# Patient Record
Sex: FEMALE | Race: BLACK OR AFRICAN AMERICAN | NOT HISPANIC OR LATINO
[De-identification: names, ages, dates, MRNs, and addresses within clinical notes are randomized per-mention and may not be internally consistent; named-entity substitution may affect disease eponyms.]

---

## 2020-09-21 ENCOUNTER — APPOINTMENT (OUTPATIENT)
Dept: ORTHOPEDIC SURGERY | Facility: CLINIC | Age: 80
End: 2020-09-21

## 2020-10-19 PROBLEM — Z00.00 ENCOUNTER FOR PREVENTIVE HEALTH EXAMINATION: Status: ACTIVE | Noted: 2020-10-19

## 2020-10-26 ENCOUNTER — APPOINTMENT (OUTPATIENT)
Dept: ORTHOPEDIC SURGERY | Facility: CLINIC | Age: 80
End: 2020-10-26
Payer: MEDICARE

## 2020-10-26 VITALS — BODY MASS INDEX: 23.92 KG/M2 | HEIGHT: 62 IN | WEIGHT: 130 LBS

## 2020-10-26 PROCEDURE — 99204 OFFICE O/P NEW MOD 45 MIN: CPT | Mod: 25

## 2020-10-26 PROCEDURE — 20611 DRAIN/INJ JOINT/BURSA W/US: CPT | Mod: 50

## 2020-10-26 PROCEDURE — 73562 X-RAY EXAM OF KNEE 3: CPT | Mod: 50

## 2020-10-26 RX ORDER — DICLOFENAC SODIUM 20 MG/G
2 SOLUTION TOPICAL
Qty: 1 | Refills: 3 | Status: ACTIVE | COMMUNITY
Start: 2020-10-26 | End: 1900-01-01

## 2020-10-26 RX ORDER — DICLOFENAC SODIUM 20 MG/G
2 SOLUTION TOPICAL
Qty: 1 | Refills: 0 | Status: ACTIVE | COMMUNITY
Start: 2020-10-26 | End: 1900-01-01

## 2020-10-26 NOTE — PROCEDURE
[de-identified] : Patient was given a cortisone injection (Lidocaine 1% 4 cc + 10 mg of Kenalog) in the lateral compartment of the knee. Injection is performed under sterile conditions with ultrasound guidance. Patient tolerated procedure well. \par \par

## 2020-10-26 NOTE — DISCUSSION/SUMMARY
[de-identified] : Surgical risks reviewed. The reasonable risks and benefits of knee replacement surgery discussed in detail with the patient and her friend Both asked appropriate questions which were answered to their satisfaction. We talked about potential complications including complications it may require revision surgery such as component loosening failure migration wear and also potential complications of infection and stiffness requiring revision surgery as well.\par \par pt will return when ready to take further steps into surgery.

## 2020-10-26 NOTE — HISTORY OF PRESENT ILLNESS
[de-identified] : pt presents today as new patient BL knee pain. Pt is from [place] and is looking for a new orthopedist to  on her care. Pt has suffered from knee pain for years and has received gel injections in the past. Pt states the right knee is worse then the left. She went though a series of 4 gel injections however had to stop because she had to move d.t the pandemic. Pts was due for another round of injections in June 2020 that she has not received.

## 2020-10-26 NOTE — PHYSICAL EXAM
[de-identified] : AP standing shows BL KNEE OA\par Right knee no joint space medially\par left knee also with narrowed joint space but less advanced than the right knee

## 2021-11-15 ENCOUNTER — APPOINTMENT (OUTPATIENT)
Dept: ORTHOPEDIC SURGERY | Facility: CLINIC | Age: 81
End: 2021-11-15

## 2021-11-29 ENCOUNTER — APPOINTMENT (OUTPATIENT)
Dept: ORTHOPEDIC SURGERY | Facility: CLINIC | Age: 81
End: 2021-11-29
Payer: MEDICARE

## 2021-11-29 PROCEDURE — 99214 OFFICE O/P EST MOD 30 MIN: CPT | Mod: 25

## 2021-11-29 PROCEDURE — 73562 X-RAY EXAM OF KNEE 3: CPT | Mod: 50

## 2021-11-29 PROCEDURE — 20611 DRAIN/INJ JOINT/BURSA W/US: CPT | Mod: 50

## 2021-12-02 NOTE — DISCUSSION/SUMMARY
[Surgical risks reviewed] : Surgical risks reviewed [de-identified] : Surgical risks reviewed. The reasonable risks and benefits of knee replacement surgery discussed in detail with the patient. Patient asked appropriate questions which were answered to their satisfaction. We talked about potential complications including complications they may require revision surgery such as component loosening failure migration wear and also potential complications of infection and stiffness.\par \par

## 2021-12-02 NOTE — PROCEDURE
[de-identified] : Patient was given a cortisone injection (Lidocaine 1% 4 cc + 10 mg of Kenalog) in the lateral compartment of the right and left knee. Injection is performed under sterile conditions with ultrasound guidance. Patient tolerated procedure well. If patient has a history of insulin- dependant diabetes they were informed of possible increase of blood glucose levels and instructed to adjust insulin accordingly.\par \par

## 2021-12-02 NOTE — HISTORY OF PRESENT ILLNESS
[de-identified] : She returns today with complaint of bilateral knee pain right greater than left. She has failed conservative measures in the past she should discuss possible knee replacement surgery as well as having bilateral cortisone injections to her

## 2021-12-02 NOTE — PHYSICAL EXAM
[de-identified] : Right knee has definite warmth tenderness at the medial joint line range of motion is 3-105°. Quad tone is good there is no effusion knee is stable to AP stress varus valgus stress.\par \par Left knee has similar findings definite warmth and tenderness at the medial joint line. Range of motion is 0-110°. The knee was showing no effusion good quad tone is noted. [de-identified] : AP standing shows BL KNEE OA\par Right knee no joint space medially\par left knee also with narrowed joint space but less advanced than the right knee. \par

## 2022-03-21 ENCOUNTER — LABORATORY RESULT (OUTPATIENT)
Age: 82
End: 2022-03-21

## 2022-03-22 ENCOUNTER — TRANSCRIPTION ENCOUNTER (OUTPATIENT)
Age: 82
End: 2022-03-22

## 2022-03-22 VITALS
HEIGHT: 64 IN | RESPIRATION RATE: 16 BRPM | OXYGEN SATURATION: 97 % | HEART RATE: 89 BPM | SYSTOLIC BLOOD PRESSURE: 148 MMHG | TEMPERATURE: 98 F | WEIGHT: 131.84 LBS | DIASTOLIC BLOOD PRESSURE: 68 MMHG

## 2022-03-22 RX ORDER — POVIDONE-IODINE 5 %
1 AEROSOL (ML) TOPICAL ONCE
Refills: 0 | Status: COMPLETED | OUTPATIENT
Start: 2022-03-23 | End: 2022-03-23

## 2022-03-22 RX ORDER — AMLODIPINE BESYLATE 2.5 MG/1
1 TABLET ORAL
Qty: 0 | Refills: 0 | DISCHARGE

## 2022-03-22 NOTE — H&P ADULT - NSHPPHYSICALEXAM_GEN_ALL_CORE
Gen: 80 y/o, NAD  MSK: Decreased right knee ROM secondary to pain Gen: 82 y/o, NAD  MSK: Decreased right knee ROM secondary to pain  Bilat LE skin without erythema, ecchymosis, abrasions or lesions, signs of infection  Calves soft, nontender bilaterally   Sensation intact to light touch bilateral lower extremities  Pulses: DP2+ bilat LE; brisk capillary refill  Hip flex intact bilat LE; EHL/FHL/TA/GS 5/5 bilaterally     Rest of PE per MD clearance

## 2022-03-22 NOTE — PATIENT PROFILE ADULT - FALL HARM RISK - UNIVERSAL INTERVENTIONS
Bed in lowest position, wheels locked, appropriate side rails in place/Call bell, personal items and telephone in reach/Instruct patient to call for assistance before getting out of bed or chair/Non-slip footwear when patient is out of bed/[place] to call system/Physically safe environment - no spills, clutter or unnecessary equipment/Purposeful Proactive Rounding/Room/bathroom lighting operational, light cord in reach

## 2022-03-22 NOTE — H&P ADULT - NSICDXPASTSURGICALHX_GEN_ALL_CORE_FT
PAST SURGICAL HISTORY:  Elective surgery Salivary gland surgery for blocked duct    S/P hysterectomy BSO

## 2022-03-22 NOTE — H&P ADULT - NSHPSOCIALHISTORY_GEN_ALL_CORE
Patient lives in [place]. Lives alone. Several stairs in her home. Cousin lives in [facility].

## 2022-03-22 NOTE — H&P ADULT - PROBLEM SELECTOR PLAN 1
Admit to Orthopedic Service for elective R TKR  Medically cleared and optimized for surgery by Dr. Ybarra

## 2022-03-22 NOTE — H&P ADULT - NSHPLABSRESULTS_GEN_ALL_CORE
Preop CBC, BMP, PT/INR, PTT within normal range  Cr- .90  COVID PCR: neg, 3/21  UA: + leuks   3M: DOS  Preop CXR within normal limits, reviewed per medical clearance   Preop EKG WNL and reviewed per medical clearance; NSR @ 74 bpm

## 2022-03-22 NOTE — H&P ADULT - NSICDXPASTMEDICALHX_GEN_ALL_CORE_FT
PAST MEDICAL HISTORY:  HTN (hypertension)     Mild asthma      PAST MEDICAL HISTORY:  HTN (hypertension)     Mild asthma     OA (osteoarthritis)     Uterine fibroid

## 2022-03-22 NOTE — H&P ADULT - HISTORY OF PRESENT ILLNESS
81 F with right knee pain x    Presents for elective R TKR 81 F with right knee pain x several years. She has tried PT, CSI, NSAIDs. She does not currently use a cane or walker. She denies numbness or tingling. Patient lives in [place] however, her cousin had surgery with Dr. Donaldson and recommended patient to have surgery with him.     Presents for elective R TKR

## 2022-03-23 ENCOUNTER — APPOINTMENT (OUTPATIENT)
Dept: ORTHOPEDIC SURGERY | Facility: HOSPITAL | Age: 82
End: 2022-03-23
Payer: MEDICARE

## 2022-03-23 ENCOUNTER — INPATIENT (INPATIENT)
Facility: HOSPITAL | Age: 82
LOS: 3 days | Discharge: HOME | DRG: 470 | End: 2022-03-27
Attending: SPECIALIST | Admitting: SPECIALIST
Payer: MEDICARE

## 2022-03-23 DIAGNOSIS — Z41.9 ENCOUNTER FOR PROCEDURE FOR PURPOSES OTHER THAN REMEDYING HEALTH STATE, UNSPECIFIED: Chronic | ICD-10-CM

## 2022-03-23 DIAGNOSIS — Z90.710 ACQUIRED ABSENCE OF BOTH CERVIX AND UTERUS: Chronic | ICD-10-CM

## 2022-03-23 DIAGNOSIS — I10 ESSENTIAL (PRIMARY) HYPERTENSION: ICD-10-CM

## 2022-03-23 DIAGNOSIS — M17.11 UNILATERAL PRIMARY OSTEOARTHRITIS, RIGHT KNEE: ICD-10-CM

## 2022-03-23 DIAGNOSIS — J45.909 UNSPECIFIED ASTHMA, UNCOMPLICATED: ICD-10-CM

## 2022-03-23 PROCEDURE — 27447 TOTAL KNEE ARTHROPLASTY: CPT | Mod: RT

## 2022-03-23 PROCEDURE — 73560 X-RAY EXAM OF KNEE 1 OR 2: CPT | Mod: 26,RT

## 2022-03-23 PROCEDURE — 27447 TOTAL KNEE ARTHROPLASTY: CPT | Mod: AS,RT

## 2022-03-23 DEVICE — INSERT ART JRNY II BCS XLPE SZ 3-4 9MM RT: Type: IMPLANTABLE DEVICE | Status: FUNCTIONAL

## 2022-03-23 DEVICE — CEMENT PALACOS R: Type: IMPLANTABLE DEVICE | Status: FUNCTIONAL

## 2022-03-23 DEVICE — IMPLANTABLE DEVICE: Type: IMPLANTABLE DEVICE | Status: FUNCTIONAL

## 2022-03-23 DEVICE — PATELLA 35MM JOURNEY 7.5 RND RSRF: Type: IMPLANTABLE DEVICE | Status: FUNCTIONAL

## 2022-03-23 DEVICE — BASEPLATE TIB JRNY NP SZ 3 RT: Type: IMPLANTABLE DEVICE | Status: FUNCTIONAL

## 2022-03-23 DEVICE — FEM COMP JRNY II BCS BICRUCIATE RT SZ 4: Type: IMPLANTABLE DEVICE | Status: FUNCTIONAL

## 2022-03-23 RX ORDER — HYDROMORPHONE HYDROCHLORIDE 2 MG/ML
0.5 INJECTION INTRAMUSCULAR; INTRAVENOUS; SUBCUTANEOUS
Refills: 0 | Status: DISCONTINUED | OUTPATIENT
Start: 2022-03-23 | End: 2022-03-27

## 2022-03-23 RX ORDER — KETOROLAC TROMETHAMINE 30 MG/ML
15 SYRINGE (ML) INJECTION EVERY 6 HOURS
Refills: 0 | Status: DISCONTINUED | OUTPATIENT
Start: 2022-03-23 | End: 2022-03-24

## 2022-03-23 RX ORDER — ASPIRIN/CALCIUM CARB/MAGNESIUM 324 MG
325 TABLET ORAL DAILY
Refills: 0 | Status: DISCONTINUED | OUTPATIENT
Start: 2022-03-23 | End: 2022-03-27

## 2022-03-23 RX ORDER — OXYCODONE HYDROCHLORIDE 5 MG/1
5 TABLET ORAL EVERY 4 HOURS
Refills: 0 | Status: DISCONTINUED | OUTPATIENT
Start: 2022-03-23 | End: 2022-03-27

## 2022-03-23 RX ORDER — AMLODIPINE BESYLATE 2.5 MG/1
7.5 TABLET ORAL DAILY
Refills: 0 | Status: DISCONTINUED | OUTPATIENT
Start: 2022-03-23 | End: 2022-03-27

## 2022-03-23 RX ORDER — POLYETHYLENE GLYCOL 3350 17 G/17G
17 POWDER, FOR SOLUTION ORAL AT BEDTIME
Refills: 0 | Status: DISCONTINUED | OUTPATIENT
Start: 2022-03-23 | End: 2022-03-27

## 2022-03-23 RX ORDER — CHOLECALCIFEROL (VITAMIN D3) 125 MCG
1000 CAPSULE ORAL DAILY
Refills: 0 | Status: DISCONTINUED | OUTPATIENT
Start: 2022-03-23 | End: 2022-03-27

## 2022-03-23 RX ORDER — SODIUM CHLORIDE 9 MG/ML
1000 INJECTION, SOLUTION INTRAVENOUS
Refills: 0 | Status: DISCONTINUED | OUTPATIENT
Start: 2022-03-23 | End: 2022-03-27

## 2022-03-23 RX ORDER — OXYCODONE HYDROCHLORIDE 5 MG/1
10 TABLET ORAL EVERY 4 HOURS
Refills: 0 | Status: DISCONTINUED | OUTPATIENT
Start: 2022-03-23 | End: 2022-03-27

## 2022-03-23 RX ORDER — MAGNESIUM HYDROXIDE 400 MG/1
30 TABLET, CHEWABLE ORAL DAILY
Refills: 0 | Status: DISCONTINUED | OUTPATIENT
Start: 2022-03-23 | End: 2022-03-27

## 2022-03-23 RX ORDER — CHLORHEXIDINE GLUCONATE 213 G/1000ML
1 SOLUTION TOPICAL ONCE
Refills: 0 | Status: COMPLETED | OUTPATIENT
Start: 2022-03-23 | End: 2022-03-23

## 2022-03-23 RX ORDER — CEFAZOLIN SODIUM 1 G
2000 VIAL (EA) INJECTION EVERY 8 HOURS
Refills: 0 | Status: COMPLETED | OUTPATIENT
Start: 2022-03-23 | End: 2022-03-24

## 2022-03-23 RX ORDER — CELECOXIB 200 MG/1
200 CAPSULE ORAL ONCE
Refills: 0 | Status: COMPLETED | OUTPATIENT
Start: 2022-03-23 | End: 2022-03-23

## 2022-03-23 RX ORDER — ONDANSETRON 8 MG/1
4 TABLET, FILM COATED ORAL EVERY 6 HOURS
Refills: 0 | Status: DISCONTINUED | OUTPATIENT
Start: 2022-03-23 | End: 2022-03-27

## 2022-03-23 RX ORDER — PANTOPRAZOLE SODIUM 20 MG/1
40 TABLET, DELAYED RELEASE ORAL
Refills: 0 | Status: DISCONTINUED | OUTPATIENT
Start: 2022-03-23 | End: 2022-03-27

## 2022-03-23 RX ORDER — OXYCODONE HYDROCHLORIDE 5 MG/1
10 TABLET ORAL ONCE
Refills: 0 | Status: DISCONTINUED | OUTPATIENT
Start: 2022-03-23 | End: 2022-03-23

## 2022-03-23 RX ORDER — ACETAMINOPHEN 500 MG
975 TABLET ORAL EVERY 8 HOURS
Refills: 0 | Status: DISCONTINUED | OUTPATIENT
Start: 2022-03-23 | End: 2022-03-27

## 2022-03-23 RX ORDER — MORPHINE SULFATE 50 MG/1
2 CAPSULE, EXTENDED RELEASE ORAL EVERY 4 HOURS
Refills: 0 | Status: DISCONTINUED | OUTPATIENT
Start: 2022-03-23 | End: 2022-03-27

## 2022-03-23 RX ORDER — SENNA PLUS 8.6 MG/1
2 TABLET ORAL AT BEDTIME
Refills: 0 | Status: DISCONTINUED | OUTPATIENT
Start: 2022-03-23 | End: 2022-03-27

## 2022-03-23 RX ADMIN — Medication 1000 UNIT(S): at 19:00

## 2022-03-23 RX ADMIN — POLYETHYLENE GLYCOL 3350 17 GRAM(S): 17 POWDER, FOR SOLUTION ORAL at 21:08

## 2022-03-23 RX ADMIN — HYDROMORPHONE HYDROCHLORIDE 0.5 MILLIGRAM(S): 2 INJECTION INTRAMUSCULAR; INTRAVENOUS; SUBCUTANEOUS at 15:33

## 2022-03-23 RX ADMIN — CELECOXIB 200 MILLIGRAM(S): 200 CAPSULE ORAL at 09:17

## 2022-03-23 RX ADMIN — SENNA PLUS 2 TABLET(S): 8.6 TABLET ORAL at 21:08

## 2022-03-23 RX ADMIN — Medication 15 MILLIGRAM(S): at 19:16

## 2022-03-23 RX ADMIN — Medication 975 MILLIGRAM(S): at 21:08

## 2022-03-23 RX ADMIN — HYDROMORPHONE HYDROCHLORIDE 0.5 MILLIGRAM(S): 2 INJECTION INTRAMUSCULAR; INTRAVENOUS; SUBCUTANEOUS at 14:44

## 2022-03-23 RX ADMIN — Medication 100 MILLIGRAM(S): at 19:00

## 2022-03-23 RX ADMIN — Medication 1 APPLICATION(S): at 08:44

## 2022-03-23 RX ADMIN — CHLORHEXIDINE GLUCONATE 1 APPLICATION(S): 213 SOLUTION TOPICAL at 08:44

## 2022-03-23 RX ADMIN — OXYCODONE HYDROCHLORIDE 10 MILLIGRAM(S): 5 TABLET ORAL at 09:17

## 2022-03-23 RX ADMIN — Medication 975 MILLIGRAM(S): at 22:08

## 2022-03-23 RX ADMIN — Medication 15 MILLIGRAM(S): at 19:01

## 2022-03-23 NOTE — BRIEF OPERATIVE NOTE - OPERATION/FINDINGS
See operative note/dictation  R TKA w [employer] PS TKA system  4R PS Femur  3R Tibia  9mm Poly  35mm Patella

## 2022-03-23 NOTE — PHYSICAL THERAPY INITIAL EVALUATION ADULT - PERTINENT HX OF CURRENT PROBLEM, REHAB EVAL
81 F with right knee pain x several years. She has tried PT, CSI, NSAIDs. She does not currently use a cane or walker. She denies numbness or tingling. Patient lives in [place] however, her cousin had surgery with Dr. Donaldson and recommended patient to have surgery with him.

## 2022-03-23 NOTE — PACU DISCHARGE NOTE - COMMENTS
Pt AxO x4. Pain relieved with Dilaudid .5 mg iv. KHAN x4. Rt knee with aquacel dsg on. V/S stable. Report given, transported to floor with RN and pCA.

## 2022-03-23 NOTE — PHYSICAL THERAPY INITIAL EVALUATION ADULT - ADDITIONAL COMMENTS
Pt will be staying with her cousin with no steps to enter. Pt denies use of AD/DME prior to sx. Pt is more [place], in her home, she had 13 RICK.

## 2022-03-23 NOTE — PROGRESS NOTE ADULT - SUBJECTIVE AND OBJECTIVE BOX
Orthopaedics Post Op Check    Procedure: right total knee replacement   Surgeon: Dr. Donaldson     Pt comfortable, without complaints  Denies CP, SOB, N/V, numbness/tingling     Vital Signs Last 24 Hrs  T(C): 36.4 (23 Mar 2022 16:04), Max: 36.9 (23 Mar 2022 13:37)  T(F): 97.5 (23 Mar 2022 16:04), Max: 98.4 (23 Mar 2022 13:37)  HR: 92 (23 Mar 2022 16:04) (82 - 92)  BP: 125/63 (23 Mar 2022 16:04) (106/56 - 148/67)  BP(mean): 91 (23 Mar 2022 15:00) (80 - 96)  RR: 16 (23 Mar 2022 16:04) (10 - 18)  SpO2: 94% (23 Mar 2022 16:04) (94% - 100%)  AVSS, NAD    Dressing C/D/I  General: Pt Alert and oriented     Pulses: DP pulses palpable bilaterally   Sensation: SLT in tact to distal bilateral lower extremities   Motor: EHL/FHL/TA/GS 5/5 bilateral lower extremities           Post op XR: right knee prosthesis in place     A/P: 81yFemale POD#0 s/p right TKR   - Stable  - Pain Control  - DVT ppx: ASA, SCDs  - Post op abx: Ancef  - PT, WBS:  WBAT  - F/U AM Labs

## 2022-03-24 ENCOUNTER — TRANSCRIPTION ENCOUNTER (OUTPATIENT)
Age: 82
End: 2022-03-24

## 2022-03-24 LAB
ANION GAP SERPL CALC-SCNC: 12 MMOL/L — SIGNIFICANT CHANGE UP (ref 5–17)
BUN SERPL-MCNC: 13 MG/DL — SIGNIFICANT CHANGE UP (ref 7–23)
CALCIUM SERPL-MCNC: 9.4 MG/DL — SIGNIFICANT CHANGE UP (ref 8.4–10.5)
CHLORIDE SERPL-SCNC: 103 MMOL/L — SIGNIFICANT CHANGE UP (ref 96–108)
CO2 SERPL-SCNC: 24 MMOL/L — SIGNIFICANT CHANGE UP (ref 22–31)
CREAT SERPL-MCNC: 0.66 MG/DL — SIGNIFICANT CHANGE UP (ref 0.5–1.3)
EGFR: 89 ML/MIN/1.73M2 — SIGNIFICANT CHANGE UP
GLUCOSE SERPL-MCNC: 123 MG/DL — HIGH (ref 70–99)
HCT VFR BLD CALC: 34.1 % — LOW (ref 34.5–45)
HGB BLD-MCNC: 11.4 G/DL — LOW (ref 11.5–15.5)
MCHC RBC-ENTMCNC: 31.8 PG — SIGNIFICANT CHANGE UP (ref 27–34)
MCHC RBC-ENTMCNC: 33.4 GM/DL — SIGNIFICANT CHANGE UP (ref 32–36)
MCV RBC AUTO: 95.3 FL — SIGNIFICANT CHANGE UP (ref 80–100)
NRBC # BLD: 0 /100 WBCS — SIGNIFICANT CHANGE UP (ref 0–0)
PLATELET # BLD AUTO: 263 K/UL — SIGNIFICANT CHANGE UP (ref 150–400)
POTASSIUM SERPL-MCNC: 4.6 MMOL/L — SIGNIFICANT CHANGE UP (ref 3.5–5.3)
POTASSIUM SERPL-SCNC: 4.6 MMOL/L — SIGNIFICANT CHANGE UP (ref 3.5–5.3)
RBC # BLD: 3.58 M/UL — LOW (ref 3.8–5.2)
RBC # FLD: 12.2 % — SIGNIFICANT CHANGE UP (ref 10.3–14.5)
SODIUM SERPL-SCNC: 139 MMOL/L — SIGNIFICANT CHANGE UP (ref 135–145)
WBC # BLD: 13.16 K/UL — HIGH (ref 3.8–10.5)
WBC # FLD AUTO: 13.16 K/UL — HIGH (ref 3.8–10.5)

## 2022-03-24 RX ADMIN — Medication 15 MILLIGRAM(S): at 13:53

## 2022-03-24 RX ADMIN — Medication 15 MILLIGRAM(S): at 06:15

## 2022-03-24 RX ADMIN — Medication 975 MILLIGRAM(S): at 13:32

## 2022-03-24 RX ADMIN — Medication 15 MILLIGRAM(S): at 00:22

## 2022-03-24 RX ADMIN — AMLODIPINE BESYLATE 7.5 MILLIGRAM(S): 2.5 TABLET ORAL at 06:00

## 2022-03-24 RX ADMIN — Medication 15 MILLIGRAM(S): at 00:08

## 2022-03-24 RX ADMIN — Medication 975 MILLIGRAM(S): at 14:53

## 2022-03-24 RX ADMIN — Medication 1000 UNIT(S): at 13:32

## 2022-03-24 RX ADMIN — Medication 15 MILLIGRAM(S): at 12:25

## 2022-03-24 RX ADMIN — SENNA PLUS 2 TABLET(S): 8.6 TABLET ORAL at 21:46

## 2022-03-24 RX ADMIN — POLYETHYLENE GLYCOL 3350 17 GRAM(S): 17 POWDER, FOR SOLUTION ORAL at 21:46

## 2022-03-24 RX ADMIN — Medication 975 MILLIGRAM(S): at 21:46

## 2022-03-24 RX ADMIN — Medication 100 MILLIGRAM(S): at 02:43

## 2022-03-24 RX ADMIN — Medication 15 MILLIGRAM(S): at 06:00

## 2022-03-24 RX ADMIN — OXYCODONE HYDROCHLORIDE 5 MILLIGRAM(S): 5 TABLET ORAL at 16:15

## 2022-03-24 RX ADMIN — Medication 975 MILLIGRAM(S): at 07:00

## 2022-03-24 RX ADMIN — Medication 975 MILLIGRAM(S): at 06:00

## 2022-03-24 RX ADMIN — PANTOPRAZOLE SODIUM 40 MILLIGRAM(S): 20 TABLET, DELAYED RELEASE ORAL at 06:00

## 2022-03-24 RX ADMIN — Medication 975 MILLIGRAM(S): at 22:46

## 2022-03-24 NOTE — DISCHARGE NOTE PROVIDER - NSDCFUADDINST_GEN_ALL_CORE_FT
Weight bear as tolerated with assistive device.  No strenuous activity, heavy lifting, driving or returning to work until cleared by MD.  You may shower - dressing is water-resistant, no soaking in bathtubs.  Remove dressing after post op day 5-7, then leave incision open to air. Keep incision clean and dry.  Try to have regular bowel movements, take stool softener or laxative if necessary.  May take Pepcid or Prilosec for upset stomach.  May take Aleve or Naproxen if needed.  Do not apply any creams or ointments on the incision or around the incision/dressing.  Swelling may travel all the way down leg to foot, this is normal and will subside in a few weeks.  Call to schedule an appt with  _________ for follow up, if you have staples or sutures they will be removed in office.  Contact your doctor if you experience: fever greater than 101.5, chills, chest pain, difficulty breathing, redness or excessive drainage around the incision, other concerns.  Follow up with your primary care provider.   Weight bear as tolerated with assistive device.  No strenuous activity, heavy lifting, driving or returning to work until cleared by MD.  You may shower - dressing is water-resistant, no soaking in bathtubs.  Remove dressing after post op day 5-7, then leave incision open to air. Keep incision clean and dry.  Try to have regular bowel movements, take stool softener or laxative if necessary.  May take Pepcid or Prilosec for upset stomach.  May take Aleve or Naproxen if needed.  Do not apply any creams or ointments on the incision or around the incision/dressing.  Swelling may travel all the way down leg to foot, this is normal and will subside in a few weeks.  Call to schedule an appt with Dr. Donaldson for follow up, if you have staples or sutures they will be removed in office.  Contact your doctor if you experience: fever greater than 101.5, chills, chest pain, difficulty breathing, redness or excessive drainage around the incision, other concerns.  Follow up with your primary care provider.

## 2022-03-24 NOTE — PROGRESS NOTE ADULT - SUBJECTIVE AND OBJECTIVE BOX
Ortho Note    Subjective:  Pt comfortable without complaints, pain controlled with current pain medication regimen.   Denies CP, SOB, N/V, numbness/tingling       Vital Signs Last 24 Hrs  T(C): 36.9 (03-24-22 @ 08:40), Max: 36.9 (03-24-22 @ 08:40)  T(F): 98.5 (03-24-22 @ 08:40), Max: 98.5 (03-24-22 @ 08:40)  HR: 99 (03-24-22 @ 08:40) (88 - 99)  BP: 144/67 (03-24-22 @ 08:40) (130/77 - 144/67)  BP(mean): --  RR: 16 (03-24-22 @ 08:40) (16 - 16)  SpO2: 97% (03-24-22 @ 08:40) (97% - 97%)  AVSS    Objective:    Physical Exam:  General: Pt Alert and oriented, NAD  Right knee aquacel DSG C/D/I  Pulses: +2 pedal pulses, wwp toes, cap refill less than 3 seconds  Sensation: silt intact  Motor: EHL/FHL/TA/GS- firing         Plan of Care:  A/P: 81yFemale POD#1 s/p Right TKA  - afebrile, nontoxic apperance  - Pain Control- tylenol 975mg PO Q8h, toradol IV 15mg Q6h x4 doses, Oxycodone 5-10mg PO Q4h prn moderate to severe pain,     - DVT ppx: aspirin 325mg PO Daily  - PT, WBS: wbat RLE  - bowel regimen, Is use  - Dispo- home pending pt clearance    Ortho Pager [phone]

## 2022-03-24 NOTE — DISCHARGE NOTE PROVIDER - NSDCMRMEDTOKEN_GEN_ALL_CORE_FT
amLODIPine: 7.5 milligram(s) orally once a day  Co Q-10: 30 milligram(s) orally once a day  cromolyn 4% ophthalmic solution:   ibuprofen 600 mg oral tablet: 1 tab(s) orally every 6 hours, As Needed  Vitamin D3 25 mcg (1000 intl units) oral tablet: 1 tab(s) orally once a day   acetaminophen 325 mg oral tablet: 3 tab(s) orally every 8 hours  amLODIPine: 7.5 milligram(s) orally once a day  celecoxib 200 mg oral capsule: 1 cap(s) orally 2 times a day MDD:2  Co Q-10: 30 milligram(s) orally once a day  cromolyn 4% ophthalmic solution:   oxyCODONE 5 mg oral tablet: 1-2 tab(s) orally every 4 hours, As needed, Moderate Pain (4 - 6) MDD:6  polyethylene glycol 3350 oral powder for reconstitution: 17 gram(s) orally once a day (at bedtime)  senna oral tablet: 2 tab(s) orally once a day (at bedtime)  Vitamin D3 25 mcg (1000 intl units) oral tablet: 1 tab(s) orally once a day

## 2022-03-24 NOTE — DISCHARGE NOTE PROVIDER - NSDCCPCAREPLAN_GEN_ALL_CORE_FT
PRINCIPAL DISCHARGE DIAGNOSIS  Diagnosis: Osteoarthritis of right knee  Assessment and Plan of Treatment: s/p Right TKA

## 2022-03-24 NOTE — DISCHARGE NOTE PROVIDER - CARE PROVIDER_API CALL
Daniel Donaldson)  Orthopaedic Surgery  [address] [place] [address], [address] [place] [address]  Phone: [phone]  Fax: [phone]  Follow Up Time: 1 week

## 2022-03-24 NOTE — DISCHARGE NOTE PROVIDER - NSDCCPTREATMENT_GEN_ALL_CORE_FT
PRINCIPAL PROCEDURE  Procedure: Right total knee arthroplasty  Findings and Treatment: osteoarthritis of the right knee

## 2022-03-24 NOTE — DISCHARGE NOTE PROVIDER - HOSPITAL COURSE
Admitted- 3-  Surgery- s/p Right TKA  Nela-op Antibiotics  Pain control  DVT prophylaxis  OOB/Physical Therapy

## 2022-03-25 ENCOUNTER — TRANSCRIPTION ENCOUNTER (OUTPATIENT)
Age: 82
End: 2022-03-25

## 2022-03-25 LAB
ANION GAP SERPL CALC-SCNC: 10 MMOL/L — SIGNIFICANT CHANGE UP (ref 5–17)
BUN SERPL-MCNC: 12 MG/DL — SIGNIFICANT CHANGE UP (ref 7–23)
CALCIUM SERPL-MCNC: 9.6 MG/DL — SIGNIFICANT CHANGE UP (ref 8.4–10.5)
CHLORIDE SERPL-SCNC: 102 MMOL/L — SIGNIFICANT CHANGE UP (ref 96–108)
CO2 SERPL-SCNC: 28 MMOL/L — SIGNIFICANT CHANGE UP (ref 22–31)
CREAT SERPL-MCNC: 0.64 MG/DL — SIGNIFICANT CHANGE UP (ref 0.5–1.3)
EGFR: 90 ML/MIN/1.73M2 — SIGNIFICANT CHANGE UP
GLUCOSE SERPL-MCNC: 99 MG/DL — SIGNIFICANT CHANGE UP (ref 70–99)
HCT VFR BLD CALC: 32.6 % — LOW (ref 34.5–45)
HGB BLD-MCNC: 10.7 G/DL — LOW (ref 11.5–15.5)
MCHC RBC-ENTMCNC: 31 PG — SIGNIFICANT CHANGE UP (ref 27–34)
MCHC RBC-ENTMCNC: 32.8 GM/DL — SIGNIFICANT CHANGE UP (ref 32–36)
MCV RBC AUTO: 94.5 FL — SIGNIFICANT CHANGE UP (ref 80–100)
NRBC # BLD: 0 /100 WBCS — SIGNIFICANT CHANGE UP (ref 0–0)
PLATELET # BLD AUTO: 227 K/UL — SIGNIFICANT CHANGE UP (ref 150–400)
POTASSIUM SERPL-MCNC: 3.4 MMOL/L — LOW (ref 3.5–5.3)
POTASSIUM SERPL-SCNC: 3.4 MMOL/L — LOW (ref 3.5–5.3)
RBC # BLD: 3.45 M/UL — LOW (ref 3.8–5.2)
RBC # FLD: 12.5 % — SIGNIFICANT CHANGE UP (ref 10.3–14.5)
SODIUM SERPL-SCNC: 140 MMOL/L — SIGNIFICANT CHANGE UP (ref 135–145)
WBC # BLD: 9.66 K/UL — SIGNIFICANT CHANGE UP (ref 3.8–10.5)
WBC # FLD AUTO: 9.66 K/UL — SIGNIFICANT CHANGE UP (ref 3.8–10.5)

## 2022-03-25 RX ORDER — SENNA PLUS 8.6 MG/1
2 TABLET ORAL
Qty: 0 | Refills: 0 | DISCHARGE
Start: 2022-03-25

## 2022-03-25 RX ORDER — OXYCODONE HYDROCHLORIDE 5 MG/1
1 TABLET ORAL
Qty: 42 | Refills: 0
Start: 2022-03-25 | End: 2022-04-02

## 2022-03-25 RX ORDER — ACETAMINOPHEN 500 MG
3 TABLET ORAL
Qty: 0 | Refills: 0 | DISCHARGE
Start: 2022-03-25

## 2022-03-25 RX ORDER — CELECOXIB 200 MG/1
1 CAPSULE ORAL
Qty: 28 | Refills: 0
Start: 2022-03-25 | End: 2022-04-09

## 2022-03-25 RX ORDER — CELECOXIB 200 MG/1
1 CAPSULE ORAL
Qty: 28 | Refills: 0
Start: 2022-03-25 | End: 2022-04-07

## 2022-03-25 RX ORDER — POLYETHYLENE GLYCOL 3350 17 G/17G
17 POWDER, FOR SOLUTION ORAL
Qty: 0 | Refills: 0 | DISCHARGE
Start: 2022-03-25

## 2022-03-25 RX ORDER — IBUPROFEN 200 MG
1 TABLET ORAL
Qty: 0 | Refills: 0 | DISCHARGE

## 2022-03-25 RX ORDER — CELECOXIB 200 MG/1
200 CAPSULE ORAL
Refills: 0 | Status: DISCONTINUED | OUTPATIENT
Start: 2022-03-25 | End: 2022-03-27

## 2022-03-25 RX ORDER — POTASSIUM CHLORIDE 20 MEQ
20 PACKET (EA) ORAL ONCE
Refills: 0 | Status: COMPLETED | OUTPATIENT
Start: 2022-03-25 | End: 2022-03-25

## 2022-03-25 RX ORDER — OXYCODONE HYDROCHLORIDE 5 MG/1
1 TABLET ORAL
Qty: 42 | Refills: 0
Start: 2022-03-25 | End: 2022-03-31

## 2022-03-25 RX ADMIN — AMLODIPINE BESYLATE 7.5 MILLIGRAM(S): 2.5 TABLET ORAL at 05:50

## 2022-03-25 RX ADMIN — Medication 975 MILLIGRAM(S): at 13:38

## 2022-03-25 RX ADMIN — CELECOXIB 200 MILLIGRAM(S): 200 CAPSULE ORAL at 18:50

## 2022-03-25 RX ADMIN — SENNA PLUS 2 TABLET(S): 8.6 TABLET ORAL at 21:16

## 2022-03-25 RX ADMIN — Medication 325 MILLIGRAM(S): at 11:55

## 2022-03-25 RX ADMIN — Medication 1000 UNIT(S): at 11:55

## 2022-03-25 RX ADMIN — CELECOXIB 200 MILLIGRAM(S): 200 CAPSULE ORAL at 17:32

## 2022-03-25 RX ADMIN — OXYCODONE HYDROCHLORIDE 10 MILLIGRAM(S): 5 TABLET ORAL at 05:49

## 2022-03-25 RX ADMIN — Medication 975 MILLIGRAM(S): at 05:50

## 2022-03-25 RX ADMIN — Medication 975 MILLIGRAM(S): at 21:16

## 2022-03-25 RX ADMIN — OXYCODONE HYDROCHLORIDE 10 MILLIGRAM(S): 5 TABLET ORAL at 06:49

## 2022-03-25 RX ADMIN — PANTOPRAZOLE SODIUM 40 MILLIGRAM(S): 20 TABLET, DELAYED RELEASE ORAL at 05:49

## 2022-03-25 RX ADMIN — Medication 975 MILLIGRAM(S): at 06:50

## 2022-03-25 RX ADMIN — Medication 20 MILLIEQUIVALENT(S): at 17:31

## 2022-03-25 RX ADMIN — Medication 975 MILLIGRAM(S): at 22:15

## 2022-03-25 RX ADMIN — Medication 975 MILLIGRAM(S): at 14:38

## 2022-03-25 RX ADMIN — POLYETHYLENE GLYCOL 3350 17 GRAM(S): 17 POWDER, FOR SOLUTION ORAL at 21:16

## 2022-03-25 NOTE — OCCUPATIONAL THERAPY INITIAL EVALUATION ADULT - ADDITIONAL COMMENTS
Pt resides in [place] alone but plans to stay at her cousin's upon discharge. Cousin's home has no RICK and has a tub. Prior to admit, pt independent with ADLs and mobility, did not require any DME or ADs.

## 2022-03-25 NOTE — OCCUPATIONAL THERAPY INITIAL EVALUATION ADULT - MODIFIED CLINICAL TEST OF SENSORY INTEGRATION IN BALANCE TEST
Pt able to ambulate from bed>bathroom>chair ~25' with RW and CGA-SBA, no LOB noted although pt demo decreased step length with RLE.

## 2022-03-25 NOTE — DISCHARGE NOTE NURSING/CASE MANAGEMENT/SOCIAL WORK - NSDCPEFALRISK_GEN_ALL_CORE
For information on Fall & Injury Prevention, visit: [email or web address] OR  [email or web address] OR  https://www.cdc.gov/steadi/patient.html

## 2022-03-25 NOTE — OCCUPATIONAL THERAPY INITIAL EVALUATION ADULT - GENERAL OBSERVATIONS, REHAB EVAL
Pt received semi-supine in bed, +heplock, +SCDs, +R knee dressing C/D/I, in NAD and agreeable to OT. Cleared by JAMI Peterson to see.

## 2022-03-25 NOTE — DISCHARGE NOTE NURSING/CASE MANAGEMENT/SOCIAL WORK - PATIENT PORTAL LINK FT
You can access the FollowMyHealth Patient Portal offered by [facility] by registering at the following website: [email or web address]. By joining [facility]’s FollowMyHealth portal, you will also be able to view your health information using other applications (apps) compatible with our system.

## 2022-03-25 NOTE — OCCUPATIONAL THERAPY INITIAL EVALUATION ADULT - PLANNED THERAPY INTERVENTIONS, OT EVAL
ADL retraining/IADL retraining/balance training/bed mobility training/motor coordination training/parent/caregiver training.../ROM/strengthening/transfer training

## 2022-03-25 NOTE — PROGRESS NOTE ADULT - ASSESSMENT
A/P: 81yFemale s/p R TKA on 03/23  - Stable  - Pain/Nausea Control  - Home meds  - AM labs stable  - DVT ppx:  QD  - WBS: WBAT RLE  - PT: rec'd home w HPT  - Dispo pending PT clearance -- likely 03/24 vs 03/25      Ortho Pager [phone]
A/P: 81yFemale s/p R TKA on 03/23  - Stable  - Pain/Nausea Control  - Home meds  - AM labs pending  - DVT ppx:  QD  - WBS: WBAT RLE  - PT: rec'd home w HPT  - Dispo pending PT clearance -- likely 03/25 vs 03/26      Ortho Pager [phone]

## 2022-03-25 NOTE — OCCUPATIONAL THERAPY INITIAL EVALUATION ADULT - RANGE OF MOTION EXAMINATION, LOWER EXTREMITY
with exception of R knee extension and hip flexion 2/2 pain/bilateral LE Active ROM was WFL  (within functional limits)

## 2022-03-25 NOTE — PROGRESS NOTE ADULT - SUBJECTIVE AND OBJECTIVE BOX
POST OPERATIVE DAY #: 2  STATUS POST: Right  TKA                       SUBJECTIVE: Patient seen and examined. Pt. stable, but c/o pain and swelling in knee today.   Denies any sob/cp/n/v/numbness or tingling in b/l les.     OBJECTIVE:     Vital Signs Last 24 Hrs  T(C): 36.7 (25 Mar 2022 08:33), Max: 36.9 (24 Mar 2022 20:34)  T(F): 98 (25 Mar 2022 08:33), Max: 98.4 (24 Mar 2022 20:34)  HR: 90 (25 Mar 2022 09:32) (83 - 99)  BP: 122/67 (25 Mar 2022 09:32) (111/64 - 163/68)  BP(mean): --  RR: 17 (25 Mar 2022 08:33) (16 - 17)  SpO2: 98% (25 Mar 2022 08:33) (95% - 98%)    Affected extremity: right le skin intact, moderated swelling at right knee region, no erythema/ecchymosis         Dressing: clean/dry/intact          Sensation: intact to light touch to patient's baseline         Motor exam: EHL/TA/GS 5/5   Pulses 2+             I&O's Detail    24 Mar 2022 07:01  -  25 Mar 2022 07:00  --------------------------------------------------------  IN:    Oral Fluid: 320 mL  Total IN: 320 mL    OUT:    Voided (mL): 450 mL  Total OUT: 450 mL    Total NET: -130 mL      25 Mar 2022 07:01  -  25 Mar 2022 10:51  --------------------------------------------------------  IN:    Oral Fluid: 320 mL  Total IN: 320 mL    OUT:    Voided (mL): 350 mL  Total OUT: 350 mL    Total NET: -30 mL          LABS:                        10.7   9.66  )-----------( 227      ( 25 Mar 2022 06:57 )             32.6     03-25    140  |  102  |  12  ----------------------------<  99  3.4<L>   |  28  |  0.64    Ca    9.6      25 Mar 2022 06:56            MEDICATIONS:    acetaminophen     Tablet .. 975 milliGRAM(s) Oral every 8 hours  HYDROmorphone  Injectable 0.5 milliGRAM(s) IV Push every 30 minutes PRN  morphine  - Injectable 2 milliGRAM(s) IV Push every 4 hours PRN  ondansetron Injectable 4 milliGRAM(s) IV Push every 6 hours PRN  oxyCODONE    IR 5 milliGRAM(s) Oral every 4 hours PRN  oxyCODONE    IR 10 milliGRAM(s) Oral every 4 hours PRN    aspirin enteric coated 325 milliGRAM(s) Oral daily        ASSESSMENT AND PLAN: 80yo Female s/p RIGHT TKR     1. Analgesic pain control- added celebrex 200mg bid   2. DVT prophylaxis: ASA         SCDs        3. Weight Bearing Status:  Weight bearing as tolerated       4. Disposition: Home pending PT clearance  POST OPERATIVE DAY #: 2  STATUS POST: Right  TKA                       SUBJECTIVE: Patient seen and examined. Pt. stable, but c/o pain and swelling in knee today.   Denies any sob/cp/n/v/numbness or tingling in b/l les.     OBJECTIVE:     Vital Signs Last 24 Hrs  T(C): 36.7 (25 Mar 2022 08:33), Max: 36.9 (24 Mar 2022 20:34)  T(F): 98 (25 Mar 2022 08:33), Max: 98.4 (24 Mar 2022 20:34)  HR: 90 (25 Mar 2022 09:32) (83 - 99)  BP: 122/67 (25 Mar 2022 09:32) (111/64 - 163/68)  BP(mean): --  RR: 17 (25 Mar 2022 08:33) (16 - 17)  SpO2: 98% (25 Mar 2022 08:33) (95% - 98%)    Affected extremity: right le skin intact, moderated swelling at right knee region, no erythema/ecchymosis         Dressing: clean/dry/intact          Sensation: intact to light touch to patient's baseline         Motor exam: EHL/TA/GS 5/5   Pulses 2+             I&O's Detail    24 Mar 2022 07:01  -  25 Mar 2022 07:00  --------------------------------------------------------  IN:    Oral Fluid: 320 mL  Total IN: 320 mL    OUT:    Voided (mL): 450 mL  Total OUT: 450 mL    Total NET: -130 mL      25 Mar 2022 07:01  -  25 Mar 2022 10:51  --------------------------------------------------------  IN:    Oral Fluid: 320 mL  Total IN: 320 mL    OUT:    Voided (mL): 350 mL  Total OUT: 350 mL    Total NET: -30 mL          LABS:                        10.7   9.66  )-----------( 227      ( 25 Mar 2022 06:57 )             32.6     03-25    140  |  102  |  12  ----------------------------<  99  3.4<L>   |  28  |  0.64    Ca    9.6      25 Mar 2022 06:56            MEDICATIONS:    acetaminophen     Tablet .. 975 milliGRAM(s) Oral every 8 hours  HYDROmorphone  Injectable 0.5 milliGRAM(s) IV Push every 30 minutes PRN  morphine  - Injectable 2 milliGRAM(s) IV Push every 4 hours PRN  ondansetron Injectable 4 milliGRAM(s) IV Push every 6 hours PRN  oxyCODONE    IR 5 milliGRAM(s) Oral every 4 hours PRN  oxyCODONE    IR 10 milliGRAM(s) Oral every 4 hours PRN    aspirin enteric coated 325 milliGRAM(s) Oral daily        ASSESSMENT AND PLAN: 82yo Female s/p RIGHT TKR     1. Analgesic pain control- added celebrex 200mg bid   2. DVT prophylaxis: ASA         SCDs        3. Weight Bearing Status:  Weight bearing as tolerated       4. Disposition: Home pending PT clearance   5. Potassium 3.4- repleted with 20meq of K x 1 dose

## 2022-03-25 NOTE — PROGRESS NOTE ADULT - SUBJECTIVE AND OBJECTIVE BOX
Ortho Note    Pt comfortable without complaints, pain controlled  Denies CP, SOB, N/V, numbness/tingling     Vital Signs Last 24 Hrs  T(C): 36.7 (25 Mar 2022 04:57), Max: 36.9 (24 Mar 2022 08:40)  T(F): 98 (25 Mar 2022 04:57), Max: 98.5 (24 Mar 2022 08:40)  HR: 85 (25 Mar 2022 04:57) (83 - 99)  BP: 139/64 (25 Mar 2022 04:57) (139/64 - 163/68)  BP(mean): --  RR: 16 (25 Mar 2022 04:57) (16 - 17)  SpO2: 96% (25 Mar 2022 04:57) (95% - 98%)      VSS  General: A&Ox3, NAD  RLE: Aquacell DSG C/D/I  Pulses: Foot WWP; DP pulse 2+; Cap refill < 2 sec  Sensation: SILT distally and symmetric to contralateral extremity  Motor: TA/EHL/FHL/GS 5/5 and symmetric to contralateral extremity    Labs:                        11.4   13.16 )-----------( 263      ( 24 Mar 2022 06:15 )             34.1       03-24    139  |  103  |  13  ----------------------------<  123<H>  4.6   |  24  |  0.66    Ca    9.4      24 Mar 2022 06:14

## 2022-03-25 NOTE — OCCUPATIONAL THERAPY INITIAL EVALUATION ADULT - DIAGNOSIS, OT EVAL
Pt is s/p R TKR (3/23) presents with impaired balance, decreased RLE ROM and strength, RLE pain, and decreased activity tolerance impacting overall ease of completing ADLs and mobility tasks at prior level of function

## 2022-03-26 LAB
ANION GAP SERPL CALC-SCNC: 10 MMOL/L — SIGNIFICANT CHANGE UP (ref 5–17)
BUN SERPL-MCNC: 13 MG/DL — SIGNIFICANT CHANGE UP (ref 7–23)
CALCIUM SERPL-MCNC: 9.6 MG/DL — SIGNIFICANT CHANGE UP (ref 8.4–10.5)
CHLORIDE SERPL-SCNC: 103 MMOL/L — SIGNIFICANT CHANGE UP (ref 96–108)
CO2 SERPL-SCNC: 26 MMOL/L — SIGNIFICANT CHANGE UP (ref 22–31)
CREAT SERPL-MCNC: 0.64 MG/DL — SIGNIFICANT CHANGE UP (ref 0.5–1.3)
EGFR: 90 ML/MIN/1.73M2 — SIGNIFICANT CHANGE UP
GLUCOSE SERPL-MCNC: 97 MG/DL — SIGNIFICANT CHANGE UP (ref 70–99)
HCT VFR BLD CALC: 32.6 % — LOW (ref 34.5–45)
HGB BLD-MCNC: 10.4 G/DL — LOW (ref 11.5–15.5)
MCHC RBC-ENTMCNC: 30.6 PG — SIGNIFICANT CHANGE UP (ref 27–34)
MCHC RBC-ENTMCNC: 31.9 GM/DL — LOW (ref 32–36)
MCV RBC AUTO: 95.9 FL — SIGNIFICANT CHANGE UP (ref 80–100)
NRBC # BLD: 0 /100 WBCS — SIGNIFICANT CHANGE UP (ref 0–0)
PLATELET # BLD AUTO: 246 K/UL — SIGNIFICANT CHANGE UP (ref 150–400)
POTASSIUM SERPL-MCNC: 3.8 MMOL/L — SIGNIFICANT CHANGE UP (ref 3.5–5.3)
POTASSIUM SERPL-SCNC: 3.8 MMOL/L — SIGNIFICANT CHANGE UP (ref 3.5–5.3)
RBC # BLD: 3.4 M/UL — LOW (ref 3.8–5.2)
RBC # FLD: 12.7 % — SIGNIFICANT CHANGE UP (ref 10.3–14.5)
SODIUM SERPL-SCNC: 139 MMOL/L — SIGNIFICANT CHANGE UP (ref 135–145)
WBC # BLD: 9.86 K/UL — SIGNIFICANT CHANGE UP (ref 3.8–10.5)
WBC # FLD AUTO: 9.86 K/UL — SIGNIFICANT CHANGE UP (ref 3.8–10.5)

## 2022-03-26 RX ADMIN — Medication 325 MILLIGRAM(S): at 11:25

## 2022-03-26 RX ADMIN — CELECOXIB 200 MILLIGRAM(S): 200 CAPSULE ORAL at 18:50

## 2022-03-26 RX ADMIN — CELECOXIB 200 MILLIGRAM(S): 200 CAPSULE ORAL at 17:50

## 2022-03-26 RX ADMIN — AMLODIPINE BESYLATE 7.5 MILLIGRAM(S): 2.5 TABLET ORAL at 05:22

## 2022-03-26 RX ADMIN — Medication 975 MILLIGRAM(S): at 14:16

## 2022-03-26 RX ADMIN — Medication 975 MILLIGRAM(S): at 13:46

## 2022-03-26 RX ADMIN — Medication 975 MILLIGRAM(S): at 21:15

## 2022-03-26 RX ADMIN — PANTOPRAZOLE SODIUM 40 MILLIGRAM(S): 20 TABLET, DELAYED RELEASE ORAL at 05:19

## 2022-03-26 RX ADMIN — CELECOXIB 200 MILLIGRAM(S): 200 CAPSULE ORAL at 05:19

## 2022-03-26 RX ADMIN — CELECOXIB 200 MILLIGRAM(S): 200 CAPSULE ORAL at 06:16

## 2022-03-26 RX ADMIN — Medication 975 MILLIGRAM(S): at 22:00

## 2022-03-26 RX ADMIN — Medication 975 MILLIGRAM(S): at 05:19

## 2022-03-26 RX ADMIN — Medication 975 MILLIGRAM(S): at 06:16

## 2022-03-26 RX ADMIN — Medication 1000 UNIT(S): at 11:25

## 2022-03-26 NOTE — PROGRESS NOTE ADULT - SUBJECTIVE AND OBJECTIVE BOX
Ortho Note    Pt comfortable without complaints, pain controlled  Denies CP, SOB, N/V, numbness/tingling     Vital Signs Last 24 Hrs  T(C): 36.4 (26 Mar 2022 04:55), Max: 36.9 (25 Mar 2022 16:22)  T(F): 97.6 (26 Mar 2022 04:55), Max: 98.5 (25 Mar 2022 16:22)  HR: 94 (26 Mar 2022 04:55) (90 - 99)  BP: 155/78 (26 Mar 2022 04:55) (105/62 - 155/78)  BP(mean): --  RR: 16 (26 Mar 2022 04:55) (16 - 18)  SpO2: 95% (26 Mar 2022 04:55) (93% - 98%)    VSS  General: A&Ox3, NAD  RLE: Aquacell DSG C/D/I  Pulses: Foot WWP; DP pulse 2+; Cap refill < 2 sec  Sensation: SILT distally and symmetric to contralateral extremity  Motor: TA/EHL/FHL/GS 5/5 and symmetric to contralateral extremity      A/P: 81yFemale s/p R TKA on 03/23  - Stable  - Pain/Nausea Control  - Home meds  - AM labs pending  - DVT ppx:  QD  - WBS: WBAT RLE  - PT: HPT pending PT clearance      Ortho Pager [phone]

## 2022-03-26 NOTE — PROGRESS NOTE ADULT - SUBJECTIVE AND OBJECTIVE BOX
Ortho Note    Pt comfortable without complaints, pain controlled  Denies CP, SOB, N/V, numbness/tingling     Vital Signs Last 24 Hrs  T(C): 36.7 (26 Mar 2022 16:17), Max: 36.7 (26 Mar 2022 09:09)  T(F): 98.1 (26 Mar 2022 16:17), Max: 98.1 (26 Mar 2022 16:17)  HR: 103 (26 Mar 2022 16:17) (94 - 103)  BP: 112/65 (26 Mar 2022 16:17) (112/65 - 155/78)  BP(mean): --  RR: 18 (26 Mar 2022 16:17) (16 - 18)  SpO2: 97% (26 Mar 2022 16:17) (94% - 97%)    VSS  General: A&Ox3, NAD  RLE: Aquacell DSG C/D/I  Pulses: Foot WWP; DP pulse 2+; Cap refill < 2 sec  Sensation: SILT distally and symmetric to contralateral extremity  Motor: TA/EHL/FHL/GS 5/5 and symmetric to contralateral extremity      A/P: 81yFemale s/p R TKA on 03/23  - Stable  - Pain/Nausea Control  - Home meds  - AM labs pending  - DVT ppx:  QD  - WBS: WBAT RLE  - PT: HPT pending PT clearance      Ortho Pager [phone]

## 2022-03-27 VITALS
RESPIRATION RATE: 17 BRPM | SYSTOLIC BLOOD PRESSURE: 116 MMHG | TEMPERATURE: 99 F | DIASTOLIC BLOOD PRESSURE: 58 MMHG | HEART RATE: 105 BPM | OXYGEN SATURATION: 98 %

## 2022-03-27 LAB
ANION GAP SERPL CALC-SCNC: 13 MMOL/L — SIGNIFICANT CHANGE UP (ref 5–17)
BUN SERPL-MCNC: 15 MG/DL — SIGNIFICANT CHANGE UP (ref 7–23)
CALCIUM SERPL-MCNC: 9.9 MG/DL — SIGNIFICANT CHANGE UP (ref 8.4–10.5)
CHLORIDE SERPL-SCNC: 102 MMOL/L — SIGNIFICANT CHANGE UP (ref 96–108)
CO2 SERPL-SCNC: 25 MMOL/L — SIGNIFICANT CHANGE UP (ref 22–31)
CREAT SERPL-MCNC: 0.75 MG/DL — SIGNIFICANT CHANGE UP (ref 0.5–1.3)
EGFR: 81 ML/MIN/1.73M2 — SIGNIFICANT CHANGE UP
GLUCOSE SERPL-MCNC: 116 MG/DL — HIGH (ref 70–99)
HCT VFR BLD CALC: 34.4 % — LOW (ref 34.5–45)
HGB BLD-MCNC: 11 G/DL — LOW (ref 11.5–15.5)
MCHC RBC-ENTMCNC: 30.5 PG — SIGNIFICANT CHANGE UP (ref 27–34)
MCHC RBC-ENTMCNC: 32 GM/DL — SIGNIFICANT CHANGE UP (ref 32–36)
MCV RBC AUTO: 95.3 FL — SIGNIFICANT CHANGE UP (ref 80–100)
NRBC # BLD: 0 /100 WBCS — SIGNIFICANT CHANGE UP (ref 0–0)
PLATELET # BLD AUTO: 327 K/UL — SIGNIFICANT CHANGE UP (ref 150–400)
POTASSIUM SERPL-MCNC: 4.3 MMOL/L — SIGNIFICANT CHANGE UP (ref 3.5–5.3)
POTASSIUM SERPL-SCNC: 4.3 MMOL/L — SIGNIFICANT CHANGE UP (ref 3.5–5.3)
RBC # BLD: 3.61 M/UL — LOW (ref 3.8–5.2)
RBC # FLD: 12.5 % — SIGNIFICANT CHANGE UP (ref 10.3–14.5)
SODIUM SERPL-SCNC: 140 MMOL/L — SIGNIFICANT CHANGE UP (ref 135–145)
WBC # BLD: 10.16 K/UL — SIGNIFICANT CHANGE UP (ref 3.8–10.5)
WBC # FLD AUTO: 10.16 K/UL — SIGNIFICANT CHANGE UP (ref 3.8–10.5)

## 2022-03-27 PROCEDURE — 73560 X-RAY EXAM OF KNEE 1 OR 2: CPT

## 2022-03-27 PROCEDURE — 80048 BASIC METABOLIC PNL TOTAL CA: CPT

## 2022-03-27 PROCEDURE — C1713: CPT

## 2022-03-27 PROCEDURE — 97161 PT EVAL LOW COMPLEX 20 MIN: CPT

## 2022-03-27 PROCEDURE — 85027 COMPLETE CBC AUTOMATED: CPT

## 2022-03-27 PROCEDURE — 36415 COLL VENOUS BLD VENIPUNCTURE: CPT

## 2022-03-27 PROCEDURE — 97110 THERAPEUTIC EXERCISES: CPT

## 2022-03-27 PROCEDURE — 97116 GAIT TRAINING THERAPY: CPT

## 2022-03-27 PROCEDURE — C1776: CPT

## 2022-03-27 PROCEDURE — 97530 THERAPEUTIC ACTIVITIES: CPT

## 2022-03-27 RX ORDER — ASPIRIN/CALCIUM CARB/MAGNESIUM 324 MG
1 TABLET ORAL
Qty: 30 | Refills: 0
Start: 2022-03-27 | End: 2022-04-25

## 2022-03-27 RX ADMIN — CELECOXIB 200 MILLIGRAM(S): 200 CAPSULE ORAL at 06:00

## 2022-03-27 RX ADMIN — Medication 975 MILLIGRAM(S): at 05:15

## 2022-03-27 RX ADMIN — Medication 975 MILLIGRAM(S): at 06:00

## 2022-03-27 RX ADMIN — CELECOXIB 200 MILLIGRAM(S): 200 CAPSULE ORAL at 05:16

## 2022-03-27 RX ADMIN — AMLODIPINE BESYLATE 7.5 MILLIGRAM(S): 2.5 TABLET ORAL at 05:16

## 2022-03-27 RX ADMIN — PANTOPRAZOLE SODIUM 40 MILLIGRAM(S): 20 TABLET, DELAYED RELEASE ORAL at 05:15

## 2022-03-27 NOTE — PROGRESS NOTE ADULT - SUBJECTIVE AND OBJECTIVE BOX
Ortho Note    Pt comfortable without complaints, pain controlled  Denies CP, SOB, N/V, numbness/tingling     Vital Signs Last 24 Hrs  T(C): 36.7 (27 Mar 2022 04:30), Max: 36.8 (26 Mar 2022 20:10)  T(F): 98.1 (27 Mar 2022 04:30), Max: 98.2 (26 Mar 2022 20:10)  HR: 95 (27 Mar 2022 04:30) (87 - 103)  BP: 147/60 (27 Mar 2022 04:30) (112/65 - 147/60)  BP(mean): --  RR: 17 (27 Mar 2022 04:30) (17 - 18)  SpO2: 95% (27 Mar 2022 04:30) (94% - 97%)    VSS  General: A&Ox3, NAD  RLE: Aquacell DSG C/D/I  Pulses: Foot WWP; DP pulse 2+; Cap refill < 2 sec  Sensation: SILT distally and symmetric to contralateral extremity  Motor: TA/EHL/FHL/GS 5/5 and symmetric to contralateral extremity    A/P: 81yFemale s/p R TKA on 03/23  - Stable  - Pain/Nausea Control  - Home meds  - AM labs pending  - DVT ppx:  QD  - WBS: WBAT RLE  - PT: HPT pending PT clearance      Ortho Pager [phone]

## 2022-03-27 NOTE — PROGRESS NOTE ADULT - REASON FOR ADMISSION
Right knee pain

## 2022-03-30 DIAGNOSIS — M17.11 UNILATERAL PRIMARY OSTEOARTHRITIS, RIGHT KNEE: ICD-10-CM

## 2022-03-30 DIAGNOSIS — I10 ESSENTIAL (PRIMARY) HYPERTENSION: ICD-10-CM

## 2022-03-30 DIAGNOSIS — J45.909 UNSPECIFIED ASTHMA, UNCOMPLICATED: ICD-10-CM

## 2022-04-07 RX ORDER — OXYCODONE AND ACETAMINOPHEN 5; 325 MG/1; MG/1
5-325 TABLET ORAL
Qty: 15 | Refills: 0 | Status: ACTIVE | COMMUNITY
Start: 2022-04-07 | End: 1900-01-01

## 2022-04-08 PROBLEM — I10 ESSENTIAL (PRIMARY) HYPERTENSION: Chronic | Status: ACTIVE | Noted: 2022-03-22

## 2022-04-08 PROBLEM — M19.90 UNSPECIFIED OSTEOARTHRITIS, UNSPECIFIED SITE: Chronic | Status: ACTIVE | Noted: 2022-03-22

## 2022-04-08 PROBLEM — J45.909 UNSPECIFIED ASTHMA, UNCOMPLICATED: Chronic | Status: ACTIVE | Noted: 2022-03-22

## 2022-04-08 PROBLEM — D25.9 LEIOMYOMA OF UTERUS, UNSPECIFIED: Chronic | Status: ACTIVE | Noted: 2022-03-22

## 2022-04-14 ENCOUNTER — APPOINTMENT (OUTPATIENT)
Dept: ORTHOPEDIC SURGERY | Facility: CLINIC | Age: 82
End: 2022-04-14
Payer: MEDICARE

## 2022-04-14 PROCEDURE — 99024 POSTOP FOLLOW-UP VISIT: CPT

## 2022-04-14 NOTE — HISTORY OF PRESENT ILLNESS
[de-identified] : Patient is here 2 and half weeks status post right knee replacement.  She is ambulating with a walker uses minimal pain tablets at this point has been doing her home PT.

## 2022-04-14 NOTE — PHYSICAL EXAM
[de-identified] : Patient's wound is nicely healed no erythema no drainage.  Range of motion is 10 to 90 degrees. [de-identified] : Radiographs of the knees were ordered today.  AP standing individual lateral and sunrise views were obtained showing a well-positioned [employer] type right knee prosthesis.

## 2022-04-14 NOTE — DISCUSSION/SUMMARY
[de-identified] : Patient will be transition to outpatient physical therapy we will also place her in a nighttime extension splint to help achieve full terminal extension follow-up in 4 5 weeks.

## 2022-04-18 RX ORDER — AMLODIPINE BESYLATE 5 MG/1
5 TABLET ORAL DAILY
Qty: 20 | Refills: 0 | Status: ACTIVE | COMMUNITY
Start: 2022-04-18 | End: 1900-01-01

## 2022-05-11 NOTE — OCCUPATIONAL THERAPY INITIAL EVALUATION ADULT - RANGE OF MOTION EXAMINATION, UPPER EXTREMITY
"Discharge Summary    CHIEF COMPLAINT ON ADMISSION  Chief Complaint   Patient presents with   • Possible Stroke       Reason for Admission  Stroke     Admission Date  5/9/2022    CODE STATUS  Full Code    HPI & HOSPITAL COURSE  For full details of admission please see the H&P of Dr. Maddox dated 5/9/2022, briefly, \"The history is obtained from healthcare providers, the medical record and the wife at the bedside as this gentleman has aphasia and has some difficulty providing a history.  This gentleman has a history of primary hypertension, dyslipidemia, CAD with prior MI and PCI to the LAD, severe FRANK with an AHI of 87.6 on CPAP at 10 cmH2O and PAD.  He was last seen well at approximately 0930 hours this morning.  His wife went to swim class and when she came home she noticed that there was sausage and singletary on the kitchen counter.  She prepared lunch and when she went to summon this gentleman for lunch she noticed that his speech was garbled.  She immediately took him to [facility].  CT imaging revealed no acute pathology, but evidence of a perfusion deficit and decreased flow in a tiny arterial branch in the left parietal region.  He received tenecteplase and was sent to [facility] for subspecialty care.  He does not smoke cigarettes.  He admits to enjoying two rather stiff highballs every evening consisting of either [place] [employer] or Festus Beam with some 7-Up.  He also admits that he likes to have a couple hits of marijuana after supper and he then he takes a gummy prior to going to bed with his CPAP.\"    Patient had a rapid improvement in his strokelike symptoms.  MRI was obtained confirming stroke.  Patient was seen by physical and Occupational Therapy who deemed him to have no significant subsequent deficits, he was noted to have a persistent left facial droop which was massively improved from admission.  Verbal status was massively improved.  Patient was stable for discharge. "  He will be continued on Plavix for stroke prevention.  He is to follow-up with his PCP in short order.  He is to follow-up with the [facility] as directed.    Therefore, he is discharged in good and stable condition to home with close outpatient follow-up.    The patient met 2-midnight criteria for an inpatient stay at the time of discharge.    Discharge Date  05/11/22      FOLLOW UP ITEMS POST DISCHARGE  outpatient PT/OT and follow up with outpatient PM&R with Dr. Barajas    DISCHARGE DIAGNOSES  Principal Problem:    Acute ischemic stroke (HCC) POA: Yes  Active Problems:    CAD (coronary artery disease) POA: Yes    Hypertension POA: Yes    PVD (peripheral vascular disease) (HCC) POA: Yes    FRANK on CPAP POA: Yes  Resolved Problems:    * No resolved hospital problems. *      FOLLOW UP  No future appointments.  Ketan Anne M.D.  [address] [facility] [address]  [facility] [address]  [phone]    Follow up in 6 week(s)  Follow up appointment      MEDICATIONS ON DISCHARGE     Medication List      START taking these medications      Instructions   clopidogrel 75 MG Tabs  Start taking on: May 12, 2022  Commonly known as: PLAVIX   Take 1 Tablet by mouth every day for 90 days.  Dose: 75 mg        CONTINUE taking these medications      Instructions   atorvastatin 40 MG Tabs  Commonly known as: LIPITOR   Take 1 Tablet by mouth 1/2 hour before dinner.  Dose: 40 mg     latanoprost 0.005 % Soln  Commonly known as: XALATAN   Administer 1 Drop into both eyes at bedtime.  Dose: 1 Drop     losartan 25 MG Tabs  Commonly known as: COZAAR   Take 1 Tablet by mouth every day.  Dose: 25 mg     metoprolol tartrate 25 MG Tabs  Commonly known as: LOPRESSOR   Take 1 Tablet by mouth 2 times a day. TAKE 1 TAB BY MOUTH 2 TIMES A DAY.  Dose: 25 mg     minocycline 100 MG Caps  Commonly known as: MINOCIN   Take 100 mg by mouth 2 times a day.  Dose: 100 mg     pimecrolimus 1 % cream  Commonly known as: ELIDEL   Apply 1 Application  topically 2 times a day.  Dose: 1 Application     traZODone 50 MG Tabs  Commonly known as: DESYREL   Take 1-2 Tablets by mouth at bedtime as needed for Sleep.  Dose:  mg            Allergies  No Known Allergies    DIET  Orders Placed This Encounter   Procedures   • Diet Order Diet: Regular     Standing Status:   Standing     Number of Occurrences:   1     Order Specific Question:   Diet:     Answer:   Regular [1]       ACTIVITY  As tolerated.  Weight bearing as tolerated    CONSULTATIONS  Mt, PM&R  Dilan, Neuro  Cornelia, [facility] Medicine    RADIOLOGY  EC-ECHOCARDIOGRAM COMPLETE W/ CONT   Final Result      MR-BRAIN-W/O   Final Result      1.  A tiny area of acute infarct in the left posterior insular cortex.   2.  Chronic infarcts in the left frontal and right posterior temporal lobes.   3.  Mild chronic microvascular ischemic disease.   4.  Mild cerebral volume mass.            PROCEDURES  None    LABORATORY  Lab Results   Component Value Date    SODIUM 145 05/10/2022    POTASSIUM 4.0 05/10/2022    CHLORIDE 108 05/10/2022    CO2 27 05/10/2022    GLUCOSE 100 (H) 05/10/2022    BUN 12 05/10/2022    CREATININE 0.92 05/10/2022        Lab Results   Component Value Date    WBC 8.6 05/11/2022    HEMOGLOBIN 14.1 05/11/2022    HEMATOCRIT 42.6 05/11/2022    PLATELETCT 209 05/11/2022        Total time of the discharge process exceeds 33 minutes.   bilateral UE Active ROM was WFL  (within functional limits)

## 2022-05-12 ENCOUNTER — APPOINTMENT (OUTPATIENT)
Dept: ORTHOPEDIC SURGERY | Facility: CLINIC | Age: 82
End: 2022-05-12
Payer: MEDICARE

## 2022-05-12 VITALS — HEIGHT: 64 IN | BODY MASS INDEX: 22.2 KG/M2 | WEIGHT: 130 LBS

## 2022-05-12 PROCEDURE — 99213 OFFICE O/P EST LOW 20 MIN: CPT | Mod: 24

## 2022-05-12 PROCEDURE — 20611 DRAIN/INJ JOINT/BURSA W/US: CPT | Mod: 1L,LT

## 2022-05-27 ENCOUNTER — LABORATORY RESULT (OUTPATIENT)
Age: 82
End: 2022-05-27

## 2022-05-31 ENCOUNTER — APPOINTMENT (OUTPATIENT)
Dept: ORTHOPEDIC SURGERY | Facility: AMBULATORY SURGERY CENTER | Age: 82
End: 2022-05-31

## 2022-05-31 PROCEDURE — 27570 FIXATION OF KNEE JOINT: CPT | Mod: 78,RT

## 2022-06-02 ENCOUNTER — APPOINTMENT (OUTPATIENT)
Dept: ORTHOPEDIC SURGERY | Facility: CLINIC | Age: 82
End: 2022-06-02
Payer: MEDICARE

## 2022-06-02 VITALS
SYSTOLIC BLOOD PRESSURE: 126 MMHG | DIASTOLIC BLOOD PRESSURE: 67 MMHG | HEART RATE: 90 BPM | OXYGEN SATURATION: 98 % | TEMPERATURE: 98.7 F | RESPIRATION RATE: 18 BRPM

## 2022-06-02 PROCEDURE — 99024 POSTOP FOLLOW-UP VISIT: CPT

## 2022-06-02 RX ORDER — ASPIRIN 325 MG/1
325 TABLET, FILM COATED ORAL
Qty: 30 | Refills: 0 | Status: ACTIVE | COMMUNITY
Start: 2022-03-27

## 2022-06-02 RX ORDER — AMLODIPINE BESYLATE 2.5 MG/1
2.5 TABLET ORAL
Qty: 90 | Refills: 0 | Status: ACTIVE | COMMUNITY
Start: 2022-05-16

## 2022-06-02 RX ORDER — HYDROCORTISONE VALERATE 2 MG/G
0.2 CREAM TOPICAL
Qty: 15 | Refills: 0 | Status: ACTIVE | COMMUNITY
Start: 2022-05-18

## 2022-06-02 RX ORDER — KETOTIFEN FUMARATE 0.25 MG/ML
0.03 SOLUTION/ DROPS OPHTHALMIC
Qty: 5 | Refills: 0 | Status: ACTIVE | COMMUNITY
Start: 2022-05-18

## 2022-06-02 RX ORDER — LEVOCETIRIZINE DIHYDROCHLORIDE 5 MG/1
5 TABLET ORAL
Qty: 30 | Refills: 0 | Status: ACTIVE | COMMUNITY
Start: 2022-05-13

## 2022-06-02 RX ORDER — OXYCODONE 5 MG/1
5 TABLET ORAL
Qty: 42 | Refills: 0 | Status: ACTIVE | COMMUNITY
Start: 2022-03-27

## 2022-06-02 RX ORDER — NITROFURANTOIN (MONOHYDRATE/MACROCRYSTALS) 25; 75 MG/1; MG/1
100 CAPSULE ORAL
Qty: 10 | Refills: 0 | Status: ACTIVE | COMMUNITY
Start: 2022-03-11

## 2022-06-02 RX ORDER — CELECOXIB 200 MG/1
200 CAPSULE ORAL
Qty: 28 | Refills: 0 | Status: ACTIVE | COMMUNITY
Start: 2022-03-27

## 2022-06-02 RX ORDER — PREDNISONE 20 MG/1
20 TABLET ORAL
Qty: 21 | Refills: 0 | Status: ACTIVE | COMMUNITY
Start: 2022-01-15

## 2022-06-02 NOTE — DISCUSSION/SUMMARY
[de-identified] : Patient will be sent to formal physical therapy to help improve her terminal flexion.  She will follow-up in 4 weeks time for evaluation.

## 2022-06-02 NOTE — HISTORY OF PRESENT ILLNESS
[de-identified] : Patient is status postop day 2 right knee manipulation under anesthesia.  He was noted during the manipulation that her terminal flexion improved from 90degrees to 120 degrees.  Patient states she feels today that she has definite improvement in her terminal flexion.

## 2022-06-02 NOTE — PHYSICAL EXAM
[de-identified] : Left knee has some mild swelling neurovascular intact range of motion is 3 to 105 degrees.

## 2022-06-22 ENCOUNTER — RX RENEWAL (OUTPATIENT)
Age: 82
End: 2022-06-22

## 2022-06-22 RX ORDER — IBUPROFEN 600 MG/1
600 TABLET, FILM COATED ORAL 3 TIMES DAILY
Qty: 90 | Refills: 0 | Status: ACTIVE | COMMUNITY
Start: 2022-05-25 | End: 1900-01-01

## 2022-06-23 ENCOUNTER — APPOINTMENT (OUTPATIENT)
Dept: ORTHOPEDIC SURGERY | Facility: CLINIC | Age: 82
End: 2022-06-23

## 2022-06-23 PROCEDURE — 99213 OFFICE O/P EST LOW 20 MIN: CPT

## 2022-06-23 NOTE — PHYSICAL EXAM
[de-identified] : The knee is swollen today there seems to be a fairly large area of scar tissue in the suprapatellar region.  There is no erythema mild warmth no drainage no redness.  Range of motion today is 20-90.

## 2022-06-23 NOTE — DISCUSSION/SUMMARY
[de-identified] : Patient seems to be worsening as opposed to improving status post manipulation.  In order to rule out an indolent infection she will be sent for lab tests such as a CBC, ESR, C-reactive protein.  Clinical suspicion is low but we need to rule this out because of the swelling and stiffness.  She will be contacted once we have the results.

## 2022-06-23 NOTE — HISTORY OF PRESENT ILLNESS
[de-identified] : Patient returns today she is not improving status post recent knee replacement right-sided with manipulation 523 approximately.  Patient states she has significant discomfort seems that the knee is stiffening as opposed to improving despite a manipulation which left her with range of motion of 3 to 105 degrees.

## 2022-06-23 NOTE — REASON FOR VISIT
[FreeTextEntry2] : 2nd visited s/p Rt knee manipulation on 5/31/2022. Still c/o of pain and very bad stiff. Sated "I am not getting better".

## 2022-07-07 ENCOUNTER — APPOINTMENT (OUTPATIENT)
Dept: ORTHOPEDIC SURGERY | Facility: CLINIC | Age: 82
End: 2022-07-07

## 2022-07-07 PROCEDURE — 99214 OFFICE O/P EST MOD 30 MIN: CPT

## 2022-07-07 NOTE — DISCUSSION/SUMMARY
[de-identified] : Patient needs to be ruled out for infection at this point.  There is no fluid we talked about the choice between an aspiration with a low potential of fluid accumulation versus bone scans patient is chosen the bone scans I thought that was the correct decision due to the fact that she upon very close examination does not seem to have any fluid that can be aspirated.\par \par Patient will therefore be sent for scans to help evaluate her for infection.  If the scans are ultimately positive patient will have to undergo staged revision procedure if they are negative we talked about potential of an open synovectomy to help improve her range of motion.

## 2022-07-07 NOTE — HISTORY OF PRESENT ILLNESS
[de-identified] : Patient returns today 5 months status post knee replacement surgery.  She is clinically stating that she is worsening in terms of pain and lack of mobility.  Patient did have a manipulation under anesthesia which at the time significantly improved her range of motion but the range of motion has not been sustained in terms of the improvement.  Patient was sent local laboratories for blood test her ESR was 31 with the upper limit being normal is 30 and the C-reactive protein was 8.7 where the upper limit of normal is 8.

## 2022-07-07 NOTE — PHYSICAL EXAM
[de-identified] : Knee exam today range of motion is 10-70 there is warmth about the knee no erythema no effusion is noted.  She is neurovascular intact distally.

## 2022-07-21 ENCOUNTER — APPOINTMENT (OUTPATIENT)
Dept: ORTHOPEDIC SURGERY | Facility: CLINIC | Age: 82
End: 2022-07-21

## 2022-07-21 PROCEDURE — 99213 OFFICE O/P EST LOW 20 MIN: CPT

## 2022-07-21 RX ORDER — PREDNISONE 10 MG/1
10 TABLET ORAL
Qty: 5 | Refills: 0 | Status: ACTIVE | COMMUNITY
Start: 2022-07-08

## 2022-07-21 RX ORDER — ALBUTEROL SULFATE 90 UG/1
108 (90 BASE) INHALANT RESPIRATORY (INHALATION)
Qty: 8 | Refills: 0 | Status: ACTIVE | COMMUNITY
Start: 2022-06-04

## 2022-07-21 RX ORDER — PREDNISONE 50 MG/1
50 TABLET ORAL
Qty: 5 | Refills: 0 | Status: ACTIVE | COMMUNITY
Start: 2022-06-04

## 2022-07-21 RX ORDER — BENZONATATE 200 MG/1
200 CAPSULE ORAL
Qty: 20 | Refills: 0 | Status: ACTIVE | COMMUNITY
Start: 2022-06-04

## 2022-07-21 RX ORDER — PIMECROLIMUS 10 MG/G
1 CREAM TOPICAL
Qty: 30 | Refills: 0 | Status: ACTIVE | COMMUNITY
Start: 2022-07-11

## 2022-07-21 NOTE — PHYSICAL EXAM
[de-identified] : Knee exam today range of motion is 10-70 there is warmth about the knee no erythema no effusion is noted.  She is neurovascular intact distally.

## 2022-07-21 NOTE — HISTORY OF PRESENT ILLNESS
[de-identified] : Patient returns today the results of her right knee bone scan indicate that she has a positive finding indeterminant however possibility of loosening postoperative changes or infection are noted.  Patient continues to have soreness and stiffness in the knee she is status post right knee replacement 5 months with a manipulation but that has not improved her knee range of motion.

## 2022-07-21 NOTE — DISCUSSION/SUMMARY
[de-identified] : Patient will be told to get a indium scan and call us she does not need to travel here for us to discuss the results.  We already talked at length about the possibilities that exist the patient may have just scar formation if we rule out infection that could be addressed possibly with a repeat manipulation or open scar excision.  We also talked at length about the process of treating an infected knee if it turns out to actively infected.  We will try to expedite her scans since we are unable to aspirate the knee despite our attempt today

## 2022-07-21 NOTE — PROCEDURE
[de-identified] : We attempted an aspiration of the knee in order to avoid further scanning.  However we were unable to have any fluid aspirated today.

## 2022-08-11 ENCOUNTER — APPOINTMENT (OUTPATIENT)
Dept: ORTHOPEDIC SURGERY | Facility: CLINIC | Age: 82
End: 2022-08-11

## 2022-09-01 ENCOUNTER — APPOINTMENT (OUTPATIENT)
Dept: ORTHOPEDIC SURGERY | Facility: CLINIC | Age: 82
End: 2022-09-01

## 2022-09-01 PROCEDURE — 99214 OFFICE O/P EST MOD 30 MIN: CPT

## 2022-09-01 NOTE — DISCUSSION/SUMMARY
[de-identified] : Had a long discussion with the patient today I believe that she can consider a repeat manipulation however I do not see any significant improvement usually with terminal extension with these manipulations.  We also had a long discussion about possible open scar excision and revision of components as needed.  Patient was told there is no guarantee that she would not get endocarditis left her surgery but we will be very thorough and removing scar tissue.  We will also be getting CT scan to help evaluate component position.  Patient will consider this option is most benefits discussed in detail of the revision surgery including nonimprovement of range of motion infection further stiffness and pain.

## 2022-09-01 NOTE — HISTORY OF PRESENT ILLNESS
[de-identified] : Patient returns today the results of her multiple scans including most recent sulfur colloid scan shows no evidence of ongoing infection.  Patient is here to discuss her options she continues to have pain and stiffness in the right knee.

## 2022-09-01 NOTE — PHYSICAL EXAM
[de-identified] : Right knee range of motion is 12-90 there is warmth about the knee.  No obvious effusion.  Soft tissue swelling is noted.  Knee stable to AP stress varus valgus stress at 90 degrees as well as 12 degrees short of full extension.

## 2022-10-10 ENCOUNTER — LABORATORY RESULT (OUTPATIENT)
Age: 82
End: 2022-10-10

## 2022-10-11 RX ORDER — POVIDONE-IODINE 5 %
1 AEROSOL (ML) TOPICAL ONCE
Refills: 0 | Status: COMPLETED | OUTPATIENT
Start: 2022-10-19 | End: 2022-10-19

## 2022-10-11 NOTE — H&P ADULT - NSHPPHYSICALEXAM_GEN_ALL_CORE
Gen: 82F, NAD  MSK: Decreased right knee ROM secondary to pain    Rest of PE per MD clearance Gen: 82F, NAD  MSK: Right knee healed surgical scar.  Skin warm and well perfused. DP pulses palpable right lower extremity . Sensation intact and equal bilateral lower extremities. EHL/TA/GS/FHL 5/5 bilateral lower extremities. Decreased right knee ROM secondary to pain    Rest of PE per MD clearance

## 2022-10-11 NOTE — H&P ADULT - NSHPLABSRESULTS_GEN_ALL_CORE
Covid: neg, 10/10 Covid: neg, 10/17  Preop CBC, BMP, PT/INR, PTT within normal range  Cr 0.77  UA negative   3M DOS   Preop EKG Sr, early PACs and reviewed per medical clearance Covid: neg, 10/17  Preop CBC, BMP, PT/INR, PTT within normal range  Cr 0.77  UA negative   Povidone iodine nasal swab to be given day of surgery   Preop EKG Sr, early PACs and reviewed per medical clearance

## 2022-10-11 NOTE — H&P ADULT - HISTORY OF PRESENT ILLNESS
82F with right knee pain x    Presents for elective right knee synovectomy/poly exchange vs revision TKR. 82F with right knee pain despite conservative therapies for her symptoms. Had  right TKR march 2021 but has had continued pain/knee swelling. Ambulates with assistance of a cane. Denies history of DVT.     Presents for elective right knee synovectomy/poly exchange vs revision TKR.

## 2022-10-11 NOTE — H&P ADULT - NSICDXPASTMEDICALHX_GEN_ALL_CORE_FT
PAST MEDICAL HISTORY:  HTN (hypertension)     Mild asthma     OA (osteoarthritis)     Uterine fibroid

## 2022-10-11 NOTE — H&P ADULT - NSICDXPASTSURGICALHX_GEN_ALL_CORE_FT
PAST SURGICAL HISTORY:  Elective surgery Salivary gland surgery for blocked duct    S/P hysterectomy BSO     PAST SURGICAL HISTORY:  Elective surgery Salivary gland surgery for blocked duct    H/O total knee replacement, right     S/P hysterectomy BSO

## 2022-10-11 NOTE — H&P ADULT - PROBLEM SELECTOR PLAN 1
Admit to Orthopedic Service for elective right knee synovectomy/poly exchange vs revision TKR.    Medically cleared and optimized for surgery by Admit to Orthopedic Service for elective right knee synovectomy/poly exchange vs revision TKR.    Medically cleared and optimized for surgery by Cardiology Dr León

## 2022-10-17 ENCOUNTER — LABORATORY RESULT (OUTPATIENT)
Age: 82
End: 2022-10-17

## 2022-10-18 ENCOUNTER — TRANSCRIPTION ENCOUNTER (OUTPATIENT)
Age: 82
End: 2022-10-18

## 2022-10-18 RX ORDER — CROMOLYN SODIUM 4 %
0 DROPS OPHTHALMIC (EYE)
Qty: 0 | Refills: 0 | DISCHARGE

## 2022-10-18 NOTE — DISCUSSION/SUMMARY
[de-identified] : Patient's range of motion is suboptimal she is slowly improving we talked at length today about the possibility of manipulation under anesthesia of the reasonable risks and benefits of the procedure were discussed.  Patient will consider this option I encouraged her to consider it strongly.  Patient also had a left knee cortisone injection today.  She will monitor her left knee symptoms follow-up as needed.

## 2022-10-18 NOTE — PROCEDURE
[de-identified] : Patient was given a cortisone injection (Lidocaine 1% 4 cc + 10 mg of Kenalog) in the lateral compartment of the left knee. Injection is performed under sterile conditions with ultrasound guidance. Patient tolerated procedure well. If patient has a history of insulin- dependant diabetes they were informed of possible increase of blood glucose levels and instructed to adjust insulin accordingly.\par \par Manufacture [employer]\par Drug name  LIDOCAINE\par NDC #  6397-4389-94\par Lot #  [id number]\par Expiration date   05/2023\par \par Manufacture  [employer]\par Drug name  KENALOG\par NDC #  1112-4814-88\par Lot #  [id number]\par Expiration date  OCT 2023\par \par  \par

## 2022-10-18 NOTE — HISTORY OF PRESENT ILLNESS
[de-identified] : Patient returns today she is approximately 10 weeks status post right knee replacement.  She was placed in a nighttime extension splint on the last visit has been medium compliant with the brace.  She continues to complain about pain in the right knee is also developing left knee pain where she has an underlying diagnosis of moderate osteoarthritis.

## 2022-10-18 NOTE — PHYSICAL EXAM
[de-identified] : Right knee range of motion is 5 to 100 degrees.  This is a slight improvement since last visit.  Wound is well-healed.  No effusion noted the knee is stable.\par \par Left knee definite medial joint line tenderness range of motion 0 to 120 degrees.  There is warmth and soft tissue swelling but no obvious effusion.

## 2022-10-18 NOTE — PATIENT PROFILE ADULT - NSPROPTRIGHTCAREGIVER_GEN_A_NUR
Gyn Surgery Scheduling Request Check List  Ins: [employer] Implant: NA Bumps  Procedure Pass   MA Steri: NA Pt Knows Date Carbo D: _____ Staff Msg   MA Hyst: NA OR Scheduled CHG B / W     Orders   Rep Email: MARIA EUGENIA Covid Order Letter: P / M One Week Prior   Diabetic: NA Epic Bertha Visit Info Pt Knows Sx Times   Stay: Day Sx w/ Ext f/u Qgenda Episode Wants Sooner Date:   D/C Meds: Normal Qgenda Time Progress Note _________________     Insurance CPT Code: _______ , _______ , _______ , _______ , _______  OR Surgical Code:     _______ , _______ , _______ , _______ , _______  Option 1:_______ Option 2:_______  Option 3:_______  Option 4:_______    Surgery Details  Provider:  Pavan hSeets MD  Dept:  Main OR  Procedure Date:  Choice  Procedure:  Single Site Robotic Hysterectomy with Bilateral Salpingectomy  Diagnosis:  Irregular Periods    PRE-OP or RN VISIT: _______ at _______ at _________       COVID: _______ at ________ at _________       POP: _______ at ________ at ___________       POP: _______ at ________ at ___________    Arrival:___________  NPO Food: _______   ____________ / ____________  Drink:____________  NPO Drink:________  _____________________ @ ___________   yes

## 2022-10-19 ENCOUNTER — APPOINTMENT (OUTPATIENT)
Dept: ORTHOPEDIC SURGERY | Facility: HOSPITAL | Age: 82
End: 2022-10-19

## 2022-10-19 ENCOUNTER — INPATIENT (INPATIENT)
Facility: HOSPITAL | Age: 82
LOS: 1 days | Discharge: HOME HEALTH | DRG: 468 | End: 2022-10-21
Attending: SPECIALIST | Admitting: SPECIALIST
Payer: MEDICARE

## 2022-10-19 VITALS
HEART RATE: 82 BPM | DIASTOLIC BLOOD PRESSURE: 67 MMHG | SYSTOLIC BLOOD PRESSURE: 148 MMHG | RESPIRATION RATE: 16 BRPM | WEIGHT: 117.51 LBS | TEMPERATURE: 98 F | HEIGHT: 63 IN | OXYGEN SATURATION: 99 %

## 2022-10-19 DIAGNOSIS — T84.84XA PAIN DUE TO INTERNAL ORTHOPEDIC PROSTHETIC DEVICES, IMPLANTS AND GRAFTS, INITIAL ENCOUNTER: ICD-10-CM

## 2022-10-19 DIAGNOSIS — Z96.651 PRESENCE OF RIGHT ARTIFICIAL KNEE JOINT: Chronic | ICD-10-CM

## 2022-10-19 DIAGNOSIS — Z41.9 ENCOUNTER FOR PROCEDURE FOR PURPOSES OTHER THAN REMEDYING HEALTH STATE, UNSPECIFIED: Chronic | ICD-10-CM

## 2022-10-19 DIAGNOSIS — Z90.710 ACQUIRED ABSENCE OF BOTH CERVIX AND UTERUS: Chronic | ICD-10-CM

## 2022-10-19 DIAGNOSIS — J45.909 UNSPECIFIED ASTHMA, UNCOMPLICATED: ICD-10-CM

## 2022-10-19 DIAGNOSIS — I10 ESSENTIAL (PRIMARY) HYPERTENSION: ICD-10-CM

## 2022-10-19 LAB
GRAM STN FLD: SIGNIFICANT CHANGE UP
GRAM STN FLD: SIGNIFICANT CHANGE UP
SPECIMEN SOURCE: SIGNIFICANT CHANGE UP
SPECIMEN SOURCE: SIGNIFICANT CHANGE UP

## 2022-10-19 PROCEDURE — 73560 X-RAY EXAM OF KNEE 1 OR 2: CPT | Mod: 26,RT

## 2022-10-19 PROCEDURE — 27447 TOTAL KNEE ARTHROPLASTY: CPT | Mod: RT

## 2022-10-19 PROCEDURE — 27447 TOTAL KNEE ARTHROPLASTY: CPT | Mod: AS,RT

## 2022-10-19 DEVICE — IMPLANTABLE DEVICE: Type: IMPLANTABLE DEVICE | Site: RIGHT | Status: FUNCTIONAL

## 2022-10-19 RX ORDER — CHLORHEXIDINE GLUCONATE 213 G/1000ML
1 SOLUTION TOPICAL ONCE
Refills: 0 | Status: COMPLETED | OUTPATIENT
Start: 2022-10-19 | End: 2022-10-19

## 2022-10-19 RX ORDER — SENNA PLUS 8.6 MG/1
2 TABLET ORAL AT BEDTIME
Refills: 0 | Status: DISCONTINUED | OUTPATIENT
Start: 2022-10-19 | End: 2022-10-21

## 2022-10-19 RX ORDER — HYDROMORPHONE HYDROCHLORIDE 2 MG/ML
0.5 INJECTION INTRAMUSCULAR; INTRAVENOUS; SUBCUTANEOUS ONCE
Refills: 0 | Status: DISCONTINUED | OUTPATIENT
Start: 2022-10-19 | End: 2022-10-21

## 2022-10-19 RX ORDER — ACETAMINOPHEN 500 MG
650 TABLET ORAL EVERY 6 HOURS
Refills: 0 | Status: DISCONTINUED | OUTPATIENT
Start: 2022-10-19 | End: 2022-10-21

## 2022-10-19 RX ORDER — OXYCODONE HYDROCHLORIDE 5 MG/1
5 TABLET ORAL
Refills: 0 | Status: DISCONTINUED | OUTPATIENT
Start: 2022-10-19 | End: 2022-10-20

## 2022-10-19 RX ORDER — OXYCODONE HYDROCHLORIDE 5 MG/1
10 TABLET ORAL ONCE
Refills: 0 | Status: DISCONTINUED | OUTPATIENT
Start: 2022-10-19 | End: 2022-10-20

## 2022-10-19 RX ORDER — CHOLECALCIFEROL (VITAMIN D3) 125 MCG
1 CAPSULE ORAL
Qty: 0 | Refills: 0 | DISCHARGE

## 2022-10-19 RX ORDER — SODIUM CHLORIDE 9 MG/ML
1000 INJECTION, SOLUTION INTRAVENOUS
Refills: 0 | Status: DISCONTINUED | OUTPATIENT
Start: 2022-10-20 | End: 2022-10-21

## 2022-10-19 RX ORDER — ASPIRIN/CALCIUM CARB/MAGNESIUM 324 MG
325 TABLET ORAL EVERY 24 HOURS
Refills: 0 | Status: DISCONTINUED | OUTPATIENT
Start: 2022-10-19 | End: 2022-10-21

## 2022-10-19 RX ORDER — POLYETHYLENE GLYCOL 3350 17 G/17G
17 POWDER, FOR SOLUTION ORAL AT BEDTIME
Refills: 0 | Status: DISCONTINUED | OUTPATIENT
Start: 2022-10-19 | End: 2022-10-21

## 2022-10-19 RX ORDER — AMLODIPINE BESYLATE 2.5 MG/1
7.5 TABLET ORAL
Qty: 0 | Refills: 0 | DISCHARGE

## 2022-10-19 RX ORDER — ONDANSETRON 8 MG/1
4 TABLET, FILM COATED ORAL EVERY 6 HOURS
Refills: 0 | Status: DISCONTINUED | OUTPATIENT
Start: 2022-10-19 | End: 2022-10-21

## 2022-10-19 RX ORDER — MAGNESIUM HYDROXIDE 400 MG/1
30 TABLET, CHEWABLE ORAL DAILY
Refills: 0 | Status: DISCONTINUED | OUTPATIENT
Start: 2022-10-19 | End: 2022-10-21

## 2022-10-19 RX ORDER — HYDROMORPHONE HYDROCHLORIDE 2 MG/ML
0.5 INJECTION INTRAMUSCULAR; INTRAVENOUS; SUBCUTANEOUS
Refills: 0 | Status: DISCONTINUED | OUTPATIENT
Start: 2022-10-19 | End: 2022-10-21

## 2022-10-19 RX ADMIN — Medication 1 TABLET(S): at 22:51

## 2022-10-19 RX ADMIN — Medication 1 APPLICATION(S): at 09:13

## 2022-10-19 RX ADMIN — Medication 650 MILLIGRAM(S): at 20:44

## 2022-10-19 RX ADMIN — Medication 650 MILLIGRAM(S): at 20:14

## 2022-10-19 RX ADMIN — HYDROMORPHONE HYDROCHLORIDE 0.5 MILLIGRAM(S): 2 INJECTION INTRAMUSCULAR; INTRAVENOUS; SUBCUTANEOUS at 20:44

## 2022-10-19 RX ADMIN — POLYETHYLENE GLYCOL 3350 17 GRAM(S): 17 POWDER, FOR SOLUTION ORAL at 22:40

## 2022-10-19 RX ADMIN — CHLORHEXIDINE GLUCONATE 1 APPLICATION(S): 213 SOLUTION TOPICAL at 08:44

## 2022-10-19 RX ADMIN — SENNA PLUS 2 TABLET(S): 8.6 TABLET ORAL at 22:40

## 2022-10-19 RX ADMIN — HYDROMORPHONE HYDROCHLORIDE 0.5 MILLIGRAM(S): 2 INJECTION INTRAMUSCULAR; INTRAVENOUS; SUBCUTANEOUS at 20:13

## 2022-10-19 NOTE — PRE-ANESTHESIA EVALUATION ADULT - NSANTHOSAYNRD_GEN_A_CORE
No. HIMANSHU screening performed.  STOP BANG Legend: 0-2 = LOW Risk; 3-4 = INTERMEDIATE Risk; 5-8 = HIGH Risk

## 2022-10-19 NOTE — BRIEF OPERATIVE NOTE - NSICDXBRIEFPROCEDURE_GEN_ALL_CORE_FT
PROCEDURES:  Revision, knee arthroplasty, total, entire tibial component 19-Oct-2022 16:49:31  Margarito Hurst

## 2022-10-19 NOTE — BRIEF OPERATIVE NOTE - OPERATION/FINDINGS
revision total knee, tibial implant exchanged, poly exchanged, femur was not exchanged, patella was not exchanged

## 2022-10-20 ENCOUNTER — TRANSCRIPTION ENCOUNTER (OUTPATIENT)
Age: 82
End: 2022-10-20

## 2022-10-20 LAB
ANION GAP SERPL CALC-SCNC: 12 MMOL/L — SIGNIFICANT CHANGE UP (ref 5–17)
BUN SERPL-MCNC: 15 MG/DL — SIGNIFICANT CHANGE UP (ref 7–23)
CALCIUM SERPL-MCNC: 9.2 MG/DL — SIGNIFICANT CHANGE UP (ref 8.4–10.5)
CHLORIDE SERPL-SCNC: 102 MMOL/L — SIGNIFICANT CHANGE UP (ref 96–108)
CO2 SERPL-SCNC: 24 MMOL/L — SIGNIFICANT CHANGE UP (ref 22–31)
CREAT SERPL-MCNC: 0.81 MG/DL — SIGNIFICANT CHANGE UP (ref 0.5–1.3)
EGFR: 73 ML/MIN/1.73M2 — SIGNIFICANT CHANGE UP
GLUCOSE SERPL-MCNC: 142 MG/DL — HIGH (ref 70–99)
HCT VFR BLD CALC: 36.1 % — SIGNIFICANT CHANGE UP (ref 34.5–45)
HGB BLD-MCNC: 11.8 G/DL — SIGNIFICANT CHANGE UP (ref 11.5–15.5)
MCHC RBC-ENTMCNC: 30.6 PG — SIGNIFICANT CHANGE UP (ref 27–34)
MCHC RBC-ENTMCNC: 32.7 GM/DL — SIGNIFICANT CHANGE UP (ref 32–36)
MCV RBC AUTO: 93.8 FL — SIGNIFICANT CHANGE UP (ref 80–100)
NIGHT BLUE STAIN TISS: SIGNIFICANT CHANGE UP
NIGHT BLUE STAIN TISS: SIGNIFICANT CHANGE UP
NRBC # BLD: 0 /100 WBCS — SIGNIFICANT CHANGE UP (ref 0–0)
PLATELET # BLD AUTO: 270 K/UL — SIGNIFICANT CHANGE UP (ref 150–400)
POTASSIUM SERPL-MCNC: 3.5 MMOL/L — SIGNIFICANT CHANGE UP (ref 3.5–5.3)
POTASSIUM SERPL-SCNC: 3.5 MMOL/L — SIGNIFICANT CHANGE UP (ref 3.5–5.3)
RBC # BLD: 3.85 M/UL — SIGNIFICANT CHANGE UP (ref 3.8–5.2)
RBC # FLD: 13.2 % — SIGNIFICANT CHANGE UP (ref 10.3–14.5)
SODIUM SERPL-SCNC: 138 MMOL/L — SIGNIFICANT CHANGE UP (ref 135–145)
SPECIMEN SOURCE: SIGNIFICANT CHANGE UP
SPECIMEN SOURCE: SIGNIFICANT CHANGE UP
WBC # BLD: 12.65 K/UL — HIGH (ref 3.8–10.5)
WBC # FLD AUTO: 12.65 K/UL — HIGH (ref 3.8–10.5)

## 2022-10-20 PROCEDURE — 99222 1ST HOSP IP/OBS MODERATE 55: CPT

## 2022-10-20 RX ORDER — UBIDECARENONE 100 MG
30 CAPSULE ORAL
Qty: 0 | Refills: 0 | DISCHARGE

## 2022-10-20 RX ORDER — ACETAMINOPHEN 500 MG
2 TABLET ORAL
Qty: 0 | Refills: 0 | DISCHARGE
Start: 2022-10-20

## 2022-10-20 RX ORDER — OXYCODONE HYDROCHLORIDE 5 MG/1
1 TABLET ORAL
Qty: 16 | Refills: 0
Start: 2022-10-20 | End: 2022-10-23

## 2022-10-20 RX ORDER — CEFAZOLIN SODIUM 1 G
2000 VIAL (EA) INJECTION EVERY 8 HOURS
Refills: 0 | Status: COMPLETED | OUTPATIENT
Start: 2022-10-20 | End: 2022-10-20

## 2022-10-20 RX ORDER — SENNA PLUS 8.6 MG/1
2 TABLET ORAL
Qty: 0 | Refills: 0 | DISCHARGE
Start: 2022-10-20

## 2022-10-20 RX ORDER — ASPIRIN/CALCIUM CARB/MAGNESIUM 324 MG
1 TABLET ORAL
Qty: 10 | Refills: 0
Start: 2022-10-20 | End: 2022-10-29

## 2022-10-20 RX ORDER — HYDROMORPHONE HYDROCHLORIDE 2 MG/ML
0.5 INJECTION INTRAMUSCULAR; INTRAVENOUS; SUBCUTANEOUS EVERY 4 HOURS
Refills: 0 | Status: DISCONTINUED | OUTPATIENT
Start: 2022-10-20 | End: 2022-10-21

## 2022-10-20 RX ORDER — CEFAZOLIN SODIUM 1 G
VIAL (EA) INJECTION
Refills: 0 | Status: COMPLETED | OUTPATIENT
Start: 2022-10-20 | End: 2022-10-20

## 2022-10-20 RX ORDER — PANTOPRAZOLE SODIUM 20 MG/1
40 TABLET, DELAYED RELEASE ORAL
Refills: 0 | Status: DISCONTINUED | OUTPATIENT
Start: 2022-10-20 | End: 2022-10-21

## 2022-10-20 RX ORDER — CEFAZOLIN SODIUM 1 G
2000 VIAL (EA) INJECTION ONCE
Refills: 0 | Status: COMPLETED | OUTPATIENT
Start: 2022-10-20 | End: 2022-10-20

## 2022-10-20 RX ORDER — AMLODIPINE BESYLATE 2.5 MG/1
7.5 TABLET ORAL DAILY
Refills: 0 | Status: DISCONTINUED | OUTPATIENT
Start: 2022-10-20 | End: 2022-10-21

## 2022-10-20 RX ORDER — ASPIRIN/CALCIUM CARB/MAGNESIUM 324 MG
1 TABLET ORAL
Qty: 9 | Refills: 0
Start: 2022-10-20 | End: 2022-10-28

## 2022-10-20 RX ORDER — OXYCODONE HYDROCHLORIDE 5 MG/1
2.5 TABLET ORAL EVERY 4 HOURS
Refills: 0 | Status: DISCONTINUED | OUTPATIENT
Start: 2022-10-20 | End: 2022-10-21

## 2022-10-20 RX ORDER — PANTOPRAZOLE SODIUM 20 MG/1
1 TABLET, DELAYED RELEASE ORAL
Qty: 14 | Refills: 0
Start: 2022-10-20 | End: 2022-11-02

## 2022-10-20 RX ORDER — LANOLIN ALCOHOL/MO/W.PET/CERES
5 CREAM (GRAM) TOPICAL AT BEDTIME
Refills: 0 | Status: DISCONTINUED | OUTPATIENT
Start: 2022-10-20 | End: 2022-10-21

## 2022-10-20 RX ORDER — CROMOLYN SODIUM 4 %
0 DROPS OPHTHALMIC (EYE)
Qty: 0 | Refills: 0 | DISCHARGE

## 2022-10-20 RX ORDER — OXYCODONE HYDROCHLORIDE 5 MG/1
5 TABLET ORAL EVERY 4 HOURS
Refills: 0 | Status: DISCONTINUED | OUTPATIENT
Start: 2022-10-20 | End: 2022-10-21

## 2022-10-20 RX ORDER — IBUPROFEN 200 MG
1 TABLET ORAL
Qty: 0 | Refills: 0 | DISCHARGE

## 2022-10-20 RX ORDER — POLYETHYLENE GLYCOL 3350 17 G/17G
17 POWDER, FOR SOLUTION ORAL
Qty: 0 | Refills: 0 | DISCHARGE
Start: 2022-10-20

## 2022-10-20 RX ADMIN — Medication 325 MILLIGRAM(S): at 06:37

## 2022-10-20 RX ADMIN — OXYCODONE HYDROCHLORIDE 5 MILLIGRAM(S): 5 TABLET ORAL at 13:45

## 2022-10-20 RX ADMIN — Medication 100 MILLIGRAM(S): at 21:37

## 2022-10-20 RX ADMIN — Medication 650 MILLIGRAM(S): at 11:50

## 2022-10-20 RX ADMIN — OXYCODONE HYDROCHLORIDE 5 MILLIGRAM(S): 5 TABLET ORAL at 18:15

## 2022-10-20 RX ADMIN — Medication 650 MILLIGRAM(S): at 07:37

## 2022-10-20 RX ADMIN — Medication 650 MILLIGRAM(S): at 23:38

## 2022-10-20 RX ADMIN — Medication 100 MILLIGRAM(S): at 11:15

## 2022-10-20 RX ADMIN — OXYCODONE HYDROCHLORIDE 5 MILLIGRAM(S): 5 TABLET ORAL at 13:12

## 2022-10-20 RX ADMIN — AMLODIPINE BESYLATE 7.5 MILLIGRAM(S): 2.5 TABLET ORAL at 11:15

## 2022-10-20 RX ADMIN — SODIUM CHLORIDE 100 MILLILITER(S): 9 INJECTION, SOLUTION INTRAVENOUS at 00:00

## 2022-10-20 RX ADMIN — Medication 1 TABLET(S): at 11:15

## 2022-10-20 RX ADMIN — OXYCODONE HYDROCHLORIDE 5 MILLIGRAM(S): 5 TABLET ORAL at 17:43

## 2022-10-20 RX ADMIN — Medication 650 MILLIGRAM(S): at 17:43

## 2022-10-20 RX ADMIN — Medication 650 MILLIGRAM(S): at 11:15

## 2022-10-20 RX ADMIN — Medication 650 MILLIGRAM(S): at 18:15

## 2022-10-20 RX ADMIN — Medication 650 MILLIGRAM(S): at 06:37

## 2022-10-20 NOTE — PHYSICAL THERAPY INITIAL EVALUATION ADULT - CRITERIA FOR SKILLED THERAPEUTIC INTERVENTIONS
impairments found/functional limitations in following categories/risk reduction/prevention/rehab potential/therapy frequency/anticipated equipment needs at discharge

## 2022-10-20 NOTE — PHYSICAL THERAPY INITIAL EVALUATION ADULT - PERTINENT HX OF CURRENT PROBLEM, REHAB EVAL
82F with right knee pain despite conservative therapies for her symptoms. Had  right TKR march 2021 but has had continued pain/knee swelling. Ambulates with assistance of a cane. Denies history of DVT.     Presents for elective right knee synovectomy/poly exchange vs revision TKR.

## 2022-10-20 NOTE — DISCHARGE NOTE PROVIDER - NSDCCPCAREPLAN_GEN_ALL_CORE_FT
PRINCIPAL DISCHARGE DIAGNOSIS  Diagnosis: Painful orthopaedic hardware  Assessment and Plan of Treatment: of L knee       PRINCIPAL DISCHARGE DIAGNOSIS  Diagnosis: Painful orthopaedic hardware  Assessment and Plan of Treatment: Austin Thomas

## 2022-10-20 NOTE — PHYSICAL THERAPY INITIAL EVALUATION ADULT - MANUAL MUSCLE TESTING RESULTS, REHAB EVAL
At least 3/5 BL UE & LE based on ability to perform antigravity mobility. Grossly good bilat  strength noted.

## 2022-10-20 NOTE — OCCUPATIONAL THERAPY INITIAL EVALUATION ADULT - PERTINENT HX OF CURRENT PROBLEM, REHAB EVAL
82F with right knee pain despite conservative therapies for her symptoms. Had  right TKR march 2021 but has had continued pain/knee swelling. Ambulates with assistance of a cane. Denies history of DVT

## 2022-10-20 NOTE — CONSULT NOTE ADULT - ASSESSMENT
82 year old female admitted for knee pain     Impression and plan   # Right knee pain s/p Right TKR   - pain control , DVT prophylaxis , Weightbearing status , Dressing changes and drain care per ortho team    # HTN   -Continue amlodipine

## 2022-10-20 NOTE — PHYSICAL THERAPY INITIAL EVALUATION ADULT - GAIT DEVIATIONS NOTED, PT EVAL
Pt initially displayed 3 pt gait however able to display 2pt gait w/ VC given. Demo dec bernadine and step length - maintains steady balance and able to maneuver RW well to avoid obstacles in unit. Good aerobic endurance, no SOB. Denied pain throughout amb./decreased bernadine/decreased step length

## 2022-10-20 NOTE — DISCHARGE NOTE PROVIDER - NSDCCPTREATMENT_GEN_ALL_CORE_FT
PRINCIPAL PROCEDURE  Procedure: Revision, knee arthroplasty, total, entire tibial component  Findings and Treatment:

## 2022-10-20 NOTE — OCCUPATIONAL THERAPY INITIAL EVALUATION ADULT - GENERAL OBSERVATIONS, REHAB EVAL
Pt's RN Rosalba aware of intent to eval/tx; cleared PT. PT Eddie present. Pt received in supine - +R knee ERNESTO surgical dressing, Pt w/ fair affect. Pt agreeable to OT.

## 2022-10-20 NOTE — DISCHARGE NOTE PROVIDER - NSDCMRMEDTOKEN_GEN_ALL_CORE_FT
acetaminophen 325 mg oral tablet: 2 tab(s) orally every 6 hours  amLODIPine: 7.5 milligram(s) orally once a day  Multiple Vitamins oral tablet: 1 tab(s) orally once a day  polyethylene glycol 3350 oral powder for reconstitution: 17 gram(s) orally once a day (at bedtime)  senna leaf extract oral tablet: 2 tab(s) orally once a day (at bedtime)  Vitamin D3 25 mcg (1000 intl units) oral tablet: 1 tab(s) orally once a day   acetaminophen 325 mg oral tablet: 2 tab(s) orally every 6 hours  amLODIPine: 7.5 milligram(s) orally once a day  aspirin 325 mg oral tablet: 1 tab(s) orally every 24 hours for 10 days  Multiple Vitamins oral tablet: 1 tab(s) orally once a day  oxyCODONE 5 mg oral tablet: 1/2- 1 tab(s) orally every 6 hours, as needed for severe pain MDD:4  pantoprazole 40 mg oral delayed release tablet: 1 tab(s) orally once a day (before a meal)  polyethylene glycol 3350 oral powder for reconstitution: 17 gram(s) orally once a day (at bedtime)  senna leaf extract oral tablet: 2 tab(s) orally once a day (at bedtime)  Vitamin D3 25 mcg (1000 intl units) oral tablet: 1 tab(s) orally once a day

## 2022-10-20 NOTE — DISCHARGE NOTE PROVIDER - CARE PROVIDER_API CALL
Daniel Donaldson)  Orthopaedic Surgery  [address] [facility] [place] [address]  Phone: [phone]  Fax: [phone]  Follow Up Time: 2 weeks

## 2022-10-20 NOTE — CONSULT NOTE ADULT - SUBJECTIVE AND OBJECTIVE BOX
Patient is a 82y old  Female who presents with a chief complaint of Right knee pain (20 Oct 2022 14:02)        HPI :  81 yo female with hx of HTN  admitted to the hospital for right knee pain , she underwent elective right knee rev TKR on 10/19.     She has a chart history of asthma , but denies having any asthma attacks in the past 10 years.     Her only complaint is right knee pain.       REVIEW OF SYSTEMS: 12 point review of systems negative except those stated else where in the note       PAST MEDICAL & SURGICAL HISTORY:  HTN (hypertension)      Mild asthma      OA (osteoarthritis)      Uterine fibroid      S/P hysterectomy  BSO      Elective surgery  Salivary gland surgery for blocked duct      H/O total knee replacement, right          Social History:  per chart review never smoker, drinks etoh monthly or less (11 Oct 2022 11:29)      FAMILY HISTORY:      Home Medications:  acetaminophen 325 mg oral tablet: 2 tab(s) orally every 6 hours (20 Oct 2022 10:37)  amLODIPine: 7.5 milligram(s) orally once a day (19 Oct 2022 09:09)  Multiple Vitamins oral tablet: 1 tab(s) orally once a day (20 Oct 2022 10:37)  polyethylene glycol 3350 oral powder for reconstitution: 17 gram(s) orally once a day (at bedtime) (20 Oct 2022 10:37)  senna leaf extract oral tablet: 2 tab(s) orally once a day (at bedtime) (20 Oct 2022 10:37)  Vitamin D3 25 mcg (1000 intl units) oral tablet: 1 tab(s) orally once a day (19 Oct 2022 09:09)      MEDICATIONS  (STANDING):  acetaminophen     Tablet .. 650 milliGRAM(s) Oral every 6 hours  amLODIPine   Tablet 7.5 milliGRAM(s) Oral daily  aspirin 325 milliGRAM(s) Oral every 24 hours  ceFAZolin   IVPB 2000 milliGRAM(s) IV Intermittent every 8 hours  ceFAZolin   IVPB      HYDROmorphone  Injectable 0.5 milliGRAM(s) IV Push once  lactated ringers. 1000 milliLiter(s) (100 mL/Hr) IV Continuous <Continuous>  multivitamin 1 Tablet(s) Oral daily  pantoprazole    Tablet 40 milliGRAM(s) Oral before breakfast  polyethylene glycol 3350 17 Gram(s) Oral at bedtime  senna 2 Tablet(s) Oral at bedtime    MEDICATIONS  (PRN):  HYDROmorphone  Injectable 0.5 milliGRAM(s) IV Push every 15 minutes PRN break through pain  HYDROmorphone  Injectable 0.5 milliGRAM(s) IV Push every 4 hours PRN breakthrough pain  magnesium hydroxide Suspension 30 milliLiter(s) Oral daily PRN Constipation  melatonin 5 milliGRAM(s) Oral at bedtime PRN Sleep  ondansetron Injectable 4 milliGRAM(s) IV Push every 6 hours PRN Nausea and/or Vomiting  oxyCODONE    IR 2.5 milliGRAM(s) Oral every 4 hours PRN Moderate Pain (4 - 6)  oxyCODONE    IR 5 milliGRAM(s) Oral every 4 hours PRN Severe Pain (7 - 10)      Vital Signs Last 24 Hrs  T(C): 36.6 (20 Oct 2022 19:58), Max: 36.8 (20 Oct 2022 16:41)  T(F): 97.9 (20 Oct 2022 19:58), Max: 98.3 (20 Oct 2022 16:41)  HR: 78 (20 Oct 2022 19:58) (69 - 87)  BP: 129/54 (20 Oct 2022 19:58) (128/64 - 149/69)  BP(mean): --  RR: 16 (20 Oct 2022 19:58) (16 - 17)  SpO2: 96% (20 Oct 2022 19:58) (95% - 98%)      10-19-22 @ 07:01  -  10-20-22 @ 07:00  --------------------------------------------------------  IN: 500 mL / OUT: 550 mL / NET: -50 mL        LABS:                        11.8   12.65 )-----------( 270      ( 20 Oct 2022 10:00 )             36.1     10-20    138  |  102  |  15  ----------------------------<  142<H>  3.5   |  24  |  0.81    Ca    9.2      20 Oct 2022 10:00          Imaging personally reviewed and radiologists report reviewed     Consultant(s) Notes Reviewed    PHYSICAL EXAM:  GENERAL: not in distress   HEAD, EYES: EOMI, PERRLA, conjunctiva and sclera clear  ENMT:  Moist mucous membranes, Good dentition, No lesions  NECK: Supple, No JVD, Normal thyroid  NERVOUS SYSTEM:  Alert & Oriented X3, Good concentration;   CHEST/LUNG: Clear to auscultation  bilaterally; No rales, rhonchi, wheezing,   HEART: Regular rate and rhythm; No murmurs, rubs, or gallops  ABDOMEN: Soft, Nontender, Nondistended; Bowel sounds present  EXTREMITIES:  2+ Peripheral Pulses, No clubbing, cyanosis, or edema  LYMPH: No lymphadenopathy noted  SKIN: No rashes or lesions    Care Discussed with Primary team

## 2022-10-20 NOTE — OCCUPATIONAL THERAPY INITIAL EVALUATION ADULT - [place] ORDER
Painful orthopaedic hardware (Admitted for elective right knee synovectomy/poly exchange vs revision TKR)  Now s/p Left total knee revision on 10/19/22

## 2022-10-20 NOTE — OCCUPATIONAL THERAPY INITIAL EVALUATION ADULT - PLANNED THERAPY INTERVENTIONS, OT EVAL
ADL retraining/IADL retraining/balance training/bed mobility training/fine motor coordination training/motor coordination training/neuromuscular re-education/orthotic fitting/training/ROM/strengthening/transfer training

## 2022-10-20 NOTE — DISCHARGE NOTE PROVIDER - HOSPITAL COURSE
Admitted 10/19/22  Attending: Dr. Donaldson   Surgery: Left revision Total knee replacement   Nela-op Antibiotics  Pain control  DVT prophylaxis  OOB/Physical Therapy   Admitted 10/19/22  Attending: Dr. Donaldson   Surgery: right revision Total knee replacement   Nela-op Antibiotics  Pain control  DVT prophylaxis  OOB/Physical Therapy

## 2022-10-20 NOTE — OCCUPATIONAL THERAPY INITIAL EVALUATION ADULT - ADDITIONAL COMMENTS
Pt states that she lives alone in private house in [place], however is currently staying at her friend's house in [facility]. Pt states that she was independent prior to admission. Pt has a cane, RW, shower chair as well as grab bars.

## 2022-10-20 NOTE — DISCHARGE NOTE PROVIDER - NSDCFUADDINST_GEN_ALL_CORE_FT
ACTIVITY:  - Weightbearing as tolerated with assistive device. No strenuous activity, heavy lifting, driving or returning to work until cleared by MD.  - You may experience postoperative swelling on the operative extremity. You may ice the surgery site for 20 minute intervals. Elevate the knee when icing to reduce swelling   - If you have had a knee replacement, do not place pillows or bolsters underneath the back of the knee.     DRESSING: You have a ERNESTO dressing. It is water resistant, but not waterproof. You may shower; however, the pump should be disconnected and placed in a safe location where it will not get wet.  No soaking in bathtubs.  The dressing has a battery that usually dies in 7 days. Once this occurs, you may remove the dressing and dispose of it, then leave incision open to air. Keep incision clean and dry.  - Keep your incision clean and dry. Do not pick at your incision. Do not apply creams, ointments or oils to your incision until cleared by your surgeon. Do not soak your incision in sitting water (ie tubs, pools, lakes, etc.) until cleared by your surgeon. You may let clean, running water fall over your incision.    MEDICATION/ANTICOAGULATION:  -You have been prescribed Aspirin 325mg Daily, as a preventative to help prevent postoperative blood clots. Please take this medication as prescribed.   - You have been prescribed medications for pain:    - Tylenol for mild to moderate pain. Do not exceed 3,000mg daily.    - For more severe pain, take Tylenol with the addition of narcotic pain medication. Take this medication as prescribed. This medication may cause drowsiness or dizziness. Do not operate machinery. This medication may cause constipation.  - For any additional medications, follow instructions on the bottle.   -Try to have regular bowel movements. Take stool softener or laxative if necessary. You may wish to take Miralax daily until you have regular bowel movements.   - If you have been prescribed Aspirin or an anti-inflammatory, please take Prilosec once a day, before breakfast, until no longer taking Aspirin or anti-inflammatory. This will help protect your stomach.  - If you have a pain management physician, please follow-up with them postoperatively.   - If you experience any negative side effects of your medications, please call your surgeon's office to discuss.    Follow-up:  - Call to schedule an appt with Dr. Donaldson for follow up. If you have staples or sutures they will be removed in office.  - Please follow-up with your primary care physician or any other specialist you see postoperatively, if needed.   - Contact your doctor if you experience: fever greater than 101.5, chills, chest pain, difficulty breathing, redness or excessive drainage around the incision, other concerns.

## 2022-10-20 NOTE — PHYSICAL THERAPY INITIAL EVALUATION ADULT - ADDITIONAL COMMENTS
Pt originally lives in [place] however has been staying in [facility] friend's elevator apt since previous R knee surgery - and will be staying there upon [facility] discharge. 4 RICK with bilateral hand rails. Pt primarily amb w/ RW however also has SC, shower chair and grab bars. Denies falls within past 6 months.

## 2022-10-21 ENCOUNTER — TRANSCRIPTION ENCOUNTER (OUTPATIENT)
Age: 82
End: 2022-10-21

## 2022-10-21 VITALS
TEMPERATURE: 99 F | OXYGEN SATURATION: 96 % | SYSTOLIC BLOOD PRESSURE: 146 MMHG | DIASTOLIC BLOOD PRESSURE: 73 MMHG | RESPIRATION RATE: 16 BRPM | HEART RATE: 75 BPM

## 2022-10-21 LAB
ANION GAP SERPL CALC-SCNC: 9 MMOL/L — SIGNIFICANT CHANGE UP (ref 5–17)
BUN SERPL-MCNC: 16 MG/DL — SIGNIFICANT CHANGE UP (ref 7–23)
CALCIUM SERPL-MCNC: 9 MG/DL — SIGNIFICANT CHANGE UP (ref 8.4–10.5)
CHLORIDE SERPL-SCNC: 105 MMOL/L — SIGNIFICANT CHANGE UP (ref 96–108)
CO2 SERPL-SCNC: 25 MMOL/L — SIGNIFICANT CHANGE UP (ref 22–31)
CREAT SERPL-MCNC: 0.92 MG/DL — SIGNIFICANT CHANGE UP (ref 0.5–1.3)
EGFR: 63 ML/MIN/1.73M2 — SIGNIFICANT CHANGE UP
GLUCOSE SERPL-MCNC: 94 MG/DL — SIGNIFICANT CHANGE UP (ref 70–99)
HCT VFR BLD CALC: 35.3 % — SIGNIFICANT CHANGE UP (ref 34.5–45)
HGB BLD-MCNC: 11.2 G/DL — LOW (ref 11.5–15.5)
MCHC RBC-ENTMCNC: 30.4 PG — SIGNIFICANT CHANGE UP (ref 27–34)
MCHC RBC-ENTMCNC: 31.7 GM/DL — LOW (ref 32–36)
MCV RBC AUTO: 95.7 FL — SIGNIFICANT CHANGE UP (ref 80–100)
NRBC # BLD: 0 /100 WBCS — SIGNIFICANT CHANGE UP (ref 0–0)
PLATELET # BLD AUTO: 271 K/UL — SIGNIFICANT CHANGE UP (ref 150–400)
POTASSIUM SERPL-MCNC: 4.1 MMOL/L — SIGNIFICANT CHANGE UP (ref 3.5–5.3)
POTASSIUM SERPL-SCNC: 4.1 MMOL/L — SIGNIFICANT CHANGE UP (ref 3.5–5.3)
RBC # BLD: 3.69 M/UL — LOW (ref 3.8–5.2)
RBC # FLD: 13.7 % — SIGNIFICANT CHANGE UP (ref 10.3–14.5)
SODIUM SERPL-SCNC: 139 MMOL/L — SIGNIFICANT CHANGE UP (ref 135–145)
WBC # BLD: 10.73 K/UL — HIGH (ref 3.8–10.5)
WBC # FLD AUTO: 10.73 K/UL — HIGH (ref 3.8–10.5)

## 2022-10-21 PROCEDURE — 85027 COMPLETE CBC AUTOMATED: CPT

## 2022-10-21 PROCEDURE — C1776: CPT

## 2022-10-21 PROCEDURE — 97161 PT EVAL LOW COMPLEX 20 MIN: CPT

## 2022-10-21 PROCEDURE — 97110 THERAPEUTIC EXERCISES: CPT

## 2022-10-21 PROCEDURE — 99232 SBSQ HOSP IP/OBS MODERATE 35: CPT

## 2022-10-21 PROCEDURE — 87116 MYCOBACTERIA CULTURE: CPT

## 2022-10-21 PROCEDURE — 87102 FUNGUS ISOLATION CULTURE: CPT

## 2022-10-21 PROCEDURE — C1713: CPT

## 2022-10-21 PROCEDURE — 80048 BASIC METABOLIC PNL TOTAL CA: CPT

## 2022-10-21 PROCEDURE — 87075 CULTR BACTERIA EXCEPT BLOOD: CPT

## 2022-10-21 PROCEDURE — 87070 CULTURE OTHR SPECIMN AEROBIC: CPT

## 2022-10-21 PROCEDURE — 97116 GAIT TRAINING THERAPY: CPT

## 2022-10-21 PROCEDURE — 36415 COLL VENOUS BLD VENIPUNCTURE: CPT

## 2022-10-21 PROCEDURE — 87206 SMEAR FLUORESCENT/ACID STAI: CPT

## 2022-10-21 PROCEDURE — 73560 X-RAY EXAM OF KNEE 1 OR 2: CPT

## 2022-10-21 RX ADMIN — OXYCODONE HYDROCHLORIDE 5 MILLIGRAM(S): 5 TABLET ORAL at 07:12

## 2022-10-21 RX ADMIN — PANTOPRAZOLE SODIUM 40 MILLIGRAM(S): 20 TABLET, DELAYED RELEASE ORAL at 06:12

## 2022-10-21 RX ADMIN — AMLODIPINE BESYLATE 7.5 MILLIGRAM(S): 2.5 TABLET ORAL at 06:12

## 2022-10-21 RX ADMIN — Medication 325 MILLIGRAM(S): at 06:10

## 2022-10-21 RX ADMIN — Medication 1 TABLET(S): at 11:44

## 2022-10-21 RX ADMIN — Medication 650 MILLIGRAM(S): at 12:20

## 2022-10-21 RX ADMIN — OXYCODONE HYDROCHLORIDE 5 MILLIGRAM(S): 5 TABLET ORAL at 06:12

## 2022-10-21 RX ADMIN — OXYCODONE HYDROCHLORIDE 5 MILLIGRAM(S): 5 TABLET ORAL at 12:20

## 2022-10-21 RX ADMIN — Medication 650 MILLIGRAM(S): at 06:12

## 2022-10-21 RX ADMIN — Medication 650 MILLIGRAM(S): at 11:44

## 2022-10-21 RX ADMIN — Medication 650 MILLIGRAM(S): at 07:12

## 2022-10-21 RX ADMIN — OXYCODONE HYDROCHLORIDE 5 MILLIGRAM(S): 5 TABLET ORAL at 11:44

## 2022-10-21 RX ADMIN — Medication 650 MILLIGRAM(S): at 00:38

## 2022-10-21 NOTE — DISCHARGE NOTE NURSING/CASE MANAGEMENT/SOCIAL WORK - PATIENT PORTAL LINK FT
You can access the FollowMyHealth Patient Portal offered by [facility] by registering at the following website: [email or web address]. By joining [facility]’s FollowMyHealth portal, you will also be able to view your health information using other applications (apps) compatible with our system.

## 2022-10-21 NOTE — DISCHARGE NOTE NURSING/CASE MANAGEMENT/SOCIAL WORK - NSDCPEFALRISK_GEN_ALL_CORE
For information on Fall & Injury Prevention, visit: [email or web address] OR  [email or web address] OR  https://www.cdc.gov/steadi/patient.html

## 2022-10-21 NOTE — PROGRESS NOTE ADULT - SUBJECTIVE AND OBJECTIVE BOX
Ortho Note    Procedure: R rev TKA  Surgeon: Anika    Pt comfortable without complaints, L knee pain controlled  Denies CP, SOB, N/V, numbness/tingling     Vital Signs Last 24 Hrs  T(C): 36.5 (20 Oct 2022 08:49), Max: 36.7 (20 Oct 2022 05:38)  T(F): 97.7 (20 Oct 2022 08:49), Max: 98.1 (20 Oct 2022 05:38)  HR: 73 (20 Oct 2022 08:49) (64 - 96)  BP: 149/69 (20 Oct 2022 08:49) (106/55 - 150/67)  BP(mean): 87 (19 Oct 2022 21:00) (77 - 97)  RR: 16 (20 Oct 2022 08:49) (12 - 22)  SpO2: 97% (20 Oct 2022 08:49) (95% - 100%)    Parameters below as of 20 Oct 2022 08:49  Patient On (Oxygen Delivery Method): room air        General: Pt Alert and oriented, NAD  ERNESTO c/d/i  Pulses: 2+ DP b/l  Sensation: silt sural/saph/dpn/spn/tib   Motor: 5/5 ehl/fhl/ta/gs      A/P: 82yFemale s/p R rev TKA  by Dr. JUD Donaldson on 10-20  - Stable  - Pain Control  - DVT ppx: asa 325 qd  - Post op abx: ancef  - f/u OR Cx - NGTD   - WBS: wbat  - PT eval     Ortho Pager [phone]
Ortho Post Op Check    Procedure: R rev TKA  Surgeon: Anika    Pt comfortable without complaints, pain controlled  Denies CP, SOB, N/V, numbness/tingling     Vital Signs Last 24 Hrs  T(C): 36.4 (10-19-22 @ 21:40), Max: 36.4 (10-19-22 @ 21:40)  T(F): 97.5 (10-19-22 @ 21:40), Max: 97.5 (10-19-22 @ 21:40)  HR: 79 (10-19-22 @ 21:40) (79 - 96)  BP: 137/68 (10-19-22 @ 21:40) (130/60 - 137/68)  BP(mean): 87 (10-19-22 @ 21:00) (87 - 89)  RR: 16 (10-19-22 @ 21:40) (13 - 22)  SpO2: 98% (10-19-22 @ 21:40) (96% - 98%)    General: Pt Alert and oriented, NAD  ERNESTO c/d/i  Pulses: 2+ DP b/l  Sensation: silt sural/saph/dpn/spn/tib   Motor: 5/5 ehl/fhl/ta/gs    Post-op X-Ray: hardware intact w/o fx     A/P: 82yFemale s/p R rev TKA  by Dr. JUD Donaldson on 10-20  - Stable  - Pain Control  - DVT ppx: asa 325 qd  - Post op abx: ancef  - f/u OR Cx  - WBS: wbat  - PT eval     Ortho Pager [phone]
Ortho Note    Pt comfortable without complaints, pain controlled  Denies CP, SOB, N/V, numbness/tingling     Vital Signs Last 24 Hrs  T(C): 37 (10-21-22 @ 08:44), Max: 37 (10-21-22 @ 08:44)  T(F): 98.6 (10-21-22 @ 08:44), Max: 98.6 (10-21-22 @ 08:44)  HR: 75 (10-21-22 @ 08:44) (75 - 93)  BP: 146/73 (10-21-22 @ 08:44) (134/55 - 146/73)  BP(mean): --  RR: 16 (10-21-22 @ 08:44) (16 - 16)  SpO2: 96% (10-21-22 @ 08:44) (96% - 96%)  AVSS    General: Pt Alert and oriented, NAD  DSG- frida C/D/I  Pulses: +2DP, WWP feet  Sensation: SILT BLE  Motor: 5/5 EHL/FHL/TA/GS                          11.8   12.65 )-----------( 270      ( 20 Oct 2022 10:00 )             36.1     10-21    139  |  105  |  16  ----------------------------<  94  4.1   |  25  |  0.92    Ca    9.0      21 Oct 2022 05:23        A/P: 82yFemale POD#2 s/p right total knee revision  - Stable  - Pain Control  - DVT ppx: ASA  - PT, WBS: WBAT  - dispo: Home today with [facility]    Ortho Pager [phone]
Ortho Note    Procedure: R rev TKA  Surgeon: Anika    Pt comfortable without complaints, L knee pain controlled  Denies CP, SOB, N/V, numbness/tingling       Vital Signs Last 24 Hrs  T(C): 37 (21 Oct 2022 08:44), Max: 37 (21 Oct 2022 08:44)  T(F): 98.6 (21 Oct 2022 08:44), Max: 98.6 (21 Oct 2022 08:44)  HR: 75 (21 Oct 2022 08:44) (75 - 93)  BP: 146/73 (21 Oct 2022 08:44) (129/54 - 146/73)  BP(mean): --  RR: 16 (21 Oct 2022 08:44) (16 - 17)  SpO2: 96% (21 Oct 2022 08:44) (96% - 98%)    Parameters below as of 21 Oct 2022 08:44  Patient On (Oxygen Delivery Method): room air          General: Pt Alert and oriented, NAD  ERNESTO c/d/i  Pulses: 2+ DP b/l  Sensation: silt sural/saph/dpn/spn/tib   Motor: 5/5 ehl/fhl/ta/gs      A/P: 82yFemale s/p R rev TKA  by Dr. JUD Donaldson on 10-20  - Stable  - Pain Control  - DVT ppx: asa 325 qd  - Post op abx: ancef  - f/u OR Cx - NGTD   - WBS: wbat  - PT eval - dispo Home pendign deirdre     Ortho Pager [phone]
Ortho Note    Pt seen and examined. Comfortable without complaints, pain controlled  Denies CP, SOB, N/V, numbness/tingling     Vital Signs Last 24 Hrs  T(C): 36.5 (10-20-22 @ 08:49), Max: 36.7 (10-20-22 @ 05:38)  T(F): 97.7 (10-20-22 @ 08:49), Max: 98.1 (10-20-22 @ 05:38)  HR: 73 (10-20-22 @ 08:49) (69 - 73)  BP: 149/69 (10-20-22 @ 08:49) (132/66 - 149/69)  BP(mean): --  RR: 16 (10-20-22 @ 08:49) (16 - 16)  SpO2: 97% (10-20-22 @ 08:49) (95% - 97%)  AVSS    General: Pt Alert and oriented, NAD  DSG- frida C/D/I  Pulses: +2DP, WWP feet  Sensation: SILT BLE  Motor: 5/5 EHL/FHL/TA/GS              A/P: 82yFemale POD#1 s/p left total knee revision  - VSS, f/u labs  - Pain Control  - DVT ppx: ASA  - PT, WBS: WBAT  - OOB for meals, I/S  - continue bowel regimen  - dispo: home with HPT pending PT clearance    Ortho Pager [phone]
Subjective:   No acute events overnight.  Patient was dressed and ambulating with walker upon my examination.  States she feels well and is ready for discharge.  Denies HA, CP, SOB, abdominal pain, nausea, vomiting, fever, chills or diarrhea.     Objective:   Vital Signs:  T(C): 37 (21 Oct 2022 08:44), Max: 37 (21 Oct 2022 08:44)  T(F): 98.6 (21 Oct 2022 08:44), Max: 98.6 (21 Oct 2022 08:44)  HR: 75 (21 Oct 2022 08:44) (75 - 93)  BP: 146/73 (21 Oct 2022 08:44) (129/54 - 146/73)  BP(mean): --  RR: 16 (21 Oct 2022 08:44) (16 - 17)  SpO2: 96% (21 Oct 2022 08:44) (96% - 98%)    Parameters below as of 21 Oct 2022 08:44  Patient On (Oxygen Delivery Method): room air    Physical Exam:   GENERAL: not in distress   HEAD, EYES: EOMI, PERRLA, conjunctiva and sclera clear  ENMT:  Moist mucous membranes, Good dentition, No lesions  NECK: Supple, No JVD, Normal thyroid  NERVOUS SYSTEM:  Alert & Oriented X3, Good concentration;   CHEST/LUNG: Clear to auscultation  bilaterally; No rales, rhonchi, wheezing,   HEART: Regular rate and rhythm; No murmurs, rubs, or gallops  ABDOMEN: Soft, Nontender, Nondistended; Bowel sounds present  EXTREMITIES:  2+ Peripheral Pulses, No clubbing, cyanosis, or edema  LYMPH: No lymphadenopathy noted  SKIN: No rashes or lesions    Labs:                        11.2   10.73 )-----------( 271      ( 21 Oct 2022 11:11 )             35.3       10-21    139  |  105  |  16  ----------------------------<  94  4.1   |  25  |  0.92    Ca    9.0      21 Oct 2022 05:23    Microbiology:     Culture - Acid Fast - Other w/Smear (collected 19 Oct 2022 16:30)  Source: .Other right posterior knee or spec    Culture - Fungal, Other (collected 19 Oct 2022 16:30)  Source: .Other right posterior knee or spec  Preliminary Report (20 Oct 2022 11:03):    Testing in progress    Culture - Surgical Swab (collected 19 Oct 2022 16:30)  Source: .Surgical Swab right posterior knee or spec  Gram Stain (19 Oct 2022 18:02):    No organisms seen    Rare WBC's  Preliminary Report (20 Oct 2022 09:09):    No growth to date.    Culture - Acid Fast - Other w/Smear (collected 19 Oct 2022 16:30)  Source: .Other right knee joint or spec    Culture - Fungal, Other (collected 19 Oct 2022 16:30)  Source: .Other right knee joint or spec  Preliminary Report (20 Oct 2022 11:03):    Testing in progress    Culture - Surgical Swab (collected 19 Oct 2022 16:30)  Source: .Surgical Swab right knee joint or spec  Gram Stain (19 Oct 2022 18:02):    No organisms seen    Rare WBC's  Preliminary Report (20 Oct 2022 09:13):    No growth to date.    Medications:  MEDICATIONS  (STANDING):  acetaminophen     Tablet .. 650 milliGRAM(s) Oral every 6 hours  amLODIPine   Tablet 7.5 milliGRAM(s) Oral daily  aspirin 325 milliGRAM(s) Oral every 24 hours  HYDROmorphone  Injectable 0.5 milliGRAM(s) IV Push once  lactated ringers. 1000 milliLiter(s) (100 mL/Hr) IV Continuous <Continuous>  multivitamin 1 Tablet(s) Oral daily  pantoprazole    Tablet 40 milliGRAM(s) Oral before breakfast  polyethylene glycol 3350 17 Gram(s) Oral at bedtime  senna 2 Tablet(s) Oral at bedtime    MEDICATIONS  (PRN):  HYDROmorphone  Injectable 0.5 milliGRAM(s) IV Push every 15 minutes PRN break through pain  HYDROmorphone  Injectable 0.5 milliGRAM(s) IV Push every 4 hours PRN breakthrough pain  magnesium hydroxide Suspension 30 milliLiter(s) Oral daily PRN Constipation  melatonin 5 milliGRAM(s) Oral at bedtime PRN Sleep  ondansetron Injectable 4 milliGRAM(s) IV Push every 6 hours PRN Nausea and/or Vomiting  oxyCODONE    IR 2.5 milliGRAM(s) Oral every 4 hours PRN Moderate Pain (4 - 6)  oxyCODONE    IR 5 milliGRAM(s) Oral every 4 hours PRN Severe Pain (7 - 10)

## 2022-10-21 NOTE — PROGRESS NOTE ADULT - ASSESSMENT
82 year old female with a PMHx of HTN who presented with right knee pain, s/p right revision TKR on 10/19 without complication.     #Right Knee Pain    #S/p Right TKR   #Post-Op State   -further management, pain control DVT PPx and dressing changes as per primary team   -culture from OR remain negative to date    -IS and bowel regimen     #Leukocytosis   -likely reactive post-operatively, f/u AM CBC   -no need for antibiotics at this time (received Ancef jose-operatively)      #HTN   -continue with home amlodipine 7.5mg daily     DVT PPx: ASA 325mg daily      Dispo: home with home care, today

## 2022-11-01 ENCOUNTER — APPOINTMENT (OUTPATIENT)
Dept: ORTHOPEDIC SURGERY | Facility: CLINIC | Age: 82
End: 2022-11-01

## 2022-11-01 DIAGNOSIS — Z90.79 ACQUIRED ABSENCE OF OTHER GENITAL ORGAN(S): ICD-10-CM

## 2022-11-01 DIAGNOSIS — Z90.722 ACQUIRED ABSENCE OF OVARIES, BILATERAL: ICD-10-CM

## 2022-11-01 DIAGNOSIS — I10 ESSENTIAL (PRIMARY) HYPERTENSION: ICD-10-CM

## 2022-11-01 DIAGNOSIS — T84.84XA PAIN DUE TO INTERNAL ORTHOPEDIC PROSTHETIC DEVICES, IMPLANTS AND GRAFTS, INITIAL ENCOUNTER: ICD-10-CM
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
## 2022-11-01 DIAGNOSIS — M19.90 UNSPECIFIED OSTEOARTHRITIS, UNSPECIFIED SITE: ICD-10-CM

## 2022-11-01 DIAGNOSIS — Z90.710 ACQUIRED ABSENCE OF BOTH CERVIX AND UTERUS: ICD-10-CM

## 2022-11-01 DIAGNOSIS — J45.909 UNSPECIFIED ASTHMA, UNCOMPLICATED: ICD-10-CM

## 2022-11-01 PROCEDURE — 99024 POSTOP FOLLOW-UP VISIT: CPT

## 2022-11-01 PROCEDURE — 73564 X-RAY EXAM KNEE 4 OR MORE: CPT | Mod: RT

## 2022-11-01 NOTE — PROCEDURE
[de-identified] : LIDOCAINE\par HIKMA FARMACEUT[employer]\par NDC 3070-6770-16\par LOT # [id number]\par EXP 05/2023\par 1% 500MG/50ML\par \par KENALOG-10\par [employer]\par NDC 3371-1092-75\par LOT #[id number]\par EXP april 2023\par 50MG/5ML\par

## 2022-11-01 NOTE — PHYSICAL EXAM
[de-identified] : Range of motion of the right knee today is 5 to 105 degrees.  Wound is nicely healed appropriate swelling neurovascular intact distally. [de-identified] : Knee x-rays were ordered today.  AP standing individual lateral sunrise views were obtained.  Right knee shows a well-positioned revision stem.

## 2022-11-01 NOTE — REASON FOR VISIT
[Post-Operative Visit] : a post-operative visit for [Artificial Knee Joint] : an artificial knee joint [FreeTextEntry2] : Post operative visit for Rt knee synovectomy. Pt states she is a lot of pain.

## 2022-11-01 NOTE — DISCUSSION/SUMMARY
[de-identified] : Patient I discussed the need to continue with physical therapy to help restore her improved range of motion and not lose any of it.  Patient did have a preprimary knee replacement flexion restriction to about 110 degrees we are hoping to improve that to about 125 degrees with continued physical therapy.  Patient will go to outpatient physical therapy follow-up with us in a month.  We will also place her in alternating nighttime extension versus flexion splint to help improve both terminal flexion and extension.

## 2022-11-12 LAB
CULTURE RESULTS: NO GROWTH — SIGNIFICANT CHANGE UP
CULTURE RESULTS: NO GROWTH — SIGNIFICANT CHANGE UP
SPECIMEN SOURCE: SIGNIFICANT CHANGE UP
SPECIMEN SOURCE: SIGNIFICANT CHANGE UP

## 2022-11-19 LAB
CULTURE RESULTS: SIGNIFICANT CHANGE UP
CULTURE RESULTS: SIGNIFICANT CHANGE UP
SPECIMEN SOURCE: SIGNIFICANT CHANGE UP
SPECIMEN SOURCE: SIGNIFICANT CHANGE UP

## 2022-12-13 ENCOUNTER — APPOINTMENT (OUTPATIENT)
Dept: ORTHOPEDIC SURGERY | Facility: CLINIC | Age: 82
End: 2022-12-13

## 2022-12-13 DIAGNOSIS — M17.0 BILATERAL PRIMARY OSTEOARTHRITIS OF KNEE: ICD-10-CM

## 2022-12-13 PROCEDURE — 99024 POSTOP FOLLOW-UP VISIT: CPT

## 2022-12-13 NOTE — REASON FOR VISIT
[Follow-Up Visit] : a follow-up visit for [Knee Pain] : knee pain [FreeTextEntry2] : 2nd visit since Pt had Rt knee synovectomy.

## 2022-12-13 NOTE — HISTORY OF PRESENT ILLNESS
[de-identified] : Patient returns today status post right knee tibial revision synovectomy for stiffness.  She has been compliant with her nighttime extension splint.  She definitely feels that she is improving her spirit is also improved.

## 2022-12-13 NOTE — PHYSICAL EXAM
[de-identified] : Right knee the wound is nicely healed the range of motion is approximately 5 to 105 degrees.

## 2022-12-13 NOTE — DISCUSSION/SUMMARY
[de-identified] : Patient I had a long discussion I believe she is slowly improving with her range of motion she also has less pain and overall is in much better spirits than I have seen her in the past year or so.  I believe that she can continue with her physical therapy regimen I have told her that she may consider a knee manipulation under anesthesia to help improve the flexion to at least 115 to 120 degrees to help improve with stair climbing getting out of a seated position.\par \par Patient is come along way I am very happy with her progress we will consider knee manipulation under anesthesia the reason risk benefits were discussed in detail including potential for complications such as fracture.  My experience I believe that we would should be able to perform this manipulation fairly safely and improve her flexion to the point where stair climbing and getting out of seated position is quite natural.  We will try to accommodate her whenever this fits into her schedule.

## 2023-01-24 ENCOUNTER — APPOINTMENT (OUTPATIENT)
Dept: ORTHOPEDIC SURGERY | Facility: CLINIC | Age: 83
End: 2023-01-24

## 2023-05-04 NOTE — OCCUPATIONAL THERAPY INITIAL EVALUATION ADULT - MUSCLE TONE ASSESSMENT, REHAB EVAL
Chief Complaint   Patient presents with   • Office Visit   • New Patient     Top and bottom of bilateral foot pain, possible fungal right #1  Agusto Henao MD 2/9/23       HISTORY OF PRESENT ILLNESS:   The patient presents for diabetic foot check with bilateral symmetrical pain of both feet as well as pain in the right hallux    REVIEW OF SYSTEMS:   CONSTITUTIONAL:  Alert and oriented x3 with pleasant mood and affect.  Has good attention to grooming, normal nutrition and development.    RESPIRATORY:  Resting respiration rate of 16.    ALLERGIES:    Allergies as of 05/04/2023 - Reviewed 05/04/2023   Allergen Reaction Noted   • Carbamazepine PRURITUS 09/12/2016       I have reviewed the patient's medications and allergies and past medical and surgical histories, updating these as appropriate.    All ROS negative except as above in the HPI.  See Histories section of the EMR for a display of this information.    PHYSICAL EXAM:   DERMATOLOGICAL:  Right hallux nail shows some signs of nail fungus.  Possible underlying exostosis of right hallux nail.  No break in tissue bilateral.  VASCULAR:  Dorsalis pedis 1/4 posterior tibial pulse 0 over 4 bilateral.  Capillary fill time is 2 seconds to all 10 toes.  NEUROLOGICAL:  Vibratory sensation is intact bilaterally.  Sensation to touch and proprioception are intact as probing with [place]-Leti needle did show all 5 dermatomes to be intact bilaterally.  Negative Babinski, negative clonus bilaterally.  DTR's, Achilles and Patellar are 2/5 bilaterally.    MUSCULOSKELETAL:  Mild lateral deviation of the hallux bilateral.  Dorsal contracture of toes 2 through 5 bilateral.    Diabetic Foot Exam Documentation     Abnormal Bilateral Foot Exam:  Right: Skin integrity is normal - no erythema, blisters, callosities, or ulcers . Dorsalis pedis pulse is present and Posterior tibial pulse is absent. Pressure sensation using the [place]-Leti is present.    Left: Skin integrity is  normal - no erythema, blisters, callosities, or ulcers . Dorsalis pedis pulse is present and Posterior tibial pulse is absent. Pressure sensation using the [place]-Leti is present.       DATA:  Hemoglobin A1 c on April 5, 2023 was 6.8  Reviewed previous encounter note.     ASSESSMENT:  1. Through 3 below are stable, chronic  1. Pain in right toe(s)    2. Dermatophytosis of nail    3. Pain in both feet    4. Valgus deformity of both great toes    5. Hammer toes of both feet    6. Type 2 diabetes mellitus with hyperglycemia, with long-term current use of insulin (CMS/[facility])    7. 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  X-ray obtained of right hallux to check for underlying bone spur in this appears apparent however I will await the radiologist's interpretation and this will be addressed surgically if present pending noninvasive arterial studies ordered today.  Prescription for ketoconazole cream for the right hallux nail.  Prescription for extra-depth diabetic shoes and custom diabetic insoles as I find these to be medically necessary for the patient due to diabetes with multiple foot deformities present, putting the patient at risk for ulceration, possible amputation, and/or reconstructive surgery.      No orders of the defined types were placed in this encounter.     normal

## 2023-07-29 NOTE — DISCHARGE NOTE NURSING/CASE MANAGEMENT/SOCIAL WORK - NSSCNAMETXT_GEN_ALL_CORE
[facility] at home  Previously Declined (within the last year)

## 2024-09-20 NOTE — REASON FOR VISIT
[Post-Operative Visit] : a post-operative visit for [Artificial Knee Joint] : an artificial knee joint [FreeTextEntry2] : Rt knee synovectomy 10/19. Pt states she is in a lot of pain. Monthly or less

## (undated) DEVICE — MARKING PEN W RULER

## (undated) DEVICE — SUT VICRYL 1 27" OS-8 UNDYED

## (undated) DEVICE — PIN DRILL 1/8 X 5

## (undated) DEVICE — DRSG COBAN 6"

## (undated) DEVICE — SAW BLADE STRYKER SAGITTAL 25X86.5X1.32MM

## (undated) DEVICE — STRYKER 4-PORT MANIFOLD W/SPECIMEN COLLECTION

## (undated) DEVICE — VENODYNE/SCD SLEEVE CALF MEDIUM

## (undated) DEVICE — POSITIONER FOAM EGG CRATE ULNAR 2PCS (PINK)

## (undated) DEVICE — SUT VICRYL 3-0 18" PS-1 UNDYED

## (undated) DEVICE — SUT VICRYL 2-0 27" CT-1 UNDYED

## (undated) DEVICE — SUT STRATAFIX SPIRAL PDO 1 30CM OS-6

## (undated) DEVICE — SAW BLADE STRYKER SAGITTAL DUAL CUT TEETH 35X64X.64MM

## (undated) DEVICE — WARMING BLANKET UPPER ADULT

## (undated) DEVICE — GLV 8.5 PROTEXIS (WHITE)

## (undated) DEVICE — DRSG ACE BANDAGE 6"

## (undated) DEVICE — HOOD FLYTE STRYKER HELMET SHIELD

## (undated) DEVICE — DRSG WEBRIL 6"

## (undated) DEVICE — SAW BLADE STRYKER SAGITTAL NARROW

## (undated) DEVICE — SUT VICRYL 2-0 27" CT-1

## (undated) DEVICE — PACK TOTAL KNEE

## (undated) DEVICE — SUT STRATAFIX SYMMETRIC PDS 1 45CM OS-6

## (undated) DEVICE — SUT STRATAFIX SPIRAL MONOCRYL PLUS 3-0 30CM PS-1 UNDYED

## (undated) DEVICE — WOUND IRR IRRISEPT W 0.5 CHG